# Patient Record
Sex: MALE | Race: WHITE | Employment: OTHER | URBAN - METROPOLITAN AREA
[De-identification: names, ages, dates, MRNs, and addresses within clinical notes are randomized per-mention and may not be internally consistent; named-entity substitution may affect disease eponyms.]

---

## 2020-03-03 ENCOUNTER — ED HISTORICAL/CONVERTED ENCOUNTER (OUTPATIENT)
Dept: OTHER | Age: 45
End: 2020-03-03

## 2020-03-11 ENCOUNTER — OP HISTORICAL/CONVERTED ENCOUNTER (OUTPATIENT)
Dept: OTHER | Age: 45
End: 2020-03-11

## 2020-09-17 ENCOUNTER — APPOINTMENT (OUTPATIENT)
Dept: GENERAL RADIOLOGY | Age: 45
End: 2020-09-17
Attending: EMERGENCY MEDICINE
Payer: COMMERCIAL

## 2020-09-17 ENCOUNTER — HOSPITAL ENCOUNTER (EMERGENCY)
Age: 45
Discharge: HOME | End: 2020-09-17
Attending: EMERGENCY MEDICINE
Payer: COMMERCIAL

## 2020-09-17 VITALS
TEMPERATURE: 97.7 F | HEART RATE: 86 BPM | SYSTOLIC BLOOD PRESSURE: 128 MMHG | WEIGHT: 260 LBS | DIASTOLIC BLOOD PRESSURE: 88 MMHG | HEIGHT: 71 IN | OXYGEN SATURATION: 95 % | BODY MASS INDEX: 36.4 KG/M2 | RESPIRATION RATE: 18 BRPM

## 2020-09-17 DIAGNOSIS — K59.00 CONSTIPATION, UNSPECIFIED CONSTIPATION TYPE: Primary | ICD-10-CM

## 2020-09-17 LAB
ALBUMIN SERPL-MCNC: 3.7 G/DL (ref 3.5–5)
ALBUMIN/GLOB SERPL: 0.9 {RATIO} (ref 1.1–2.2)
ALP SERPL-CCNC: 72 U/L (ref 45–117)
ALT SERPL-CCNC: 22 U/L (ref 12–78)
ANION GAP SERPL CALC-SCNC: 9 MMOL/L (ref 5–15)
APPEARANCE UR: CLEAR
AST SERPL W P-5'-P-CCNC: 16 U/L (ref 15–37)
BACTERIA URNS QL MICRO: NEGATIVE /HPF
BASOPHILS # BLD: 0 K/UL (ref 0–0.1)
BASOPHILS NFR BLD: 0 % (ref 0–1)
BILIRUB SERPL-MCNC: 1.1 MG/DL (ref 0.2–1)
BILIRUB UR QL: NEGATIVE
BUN SERPL-MCNC: 25 MG/DL (ref 6–20)
BUN/CREAT SERPL: 21 (ref 12–20)
CA-I BLD-MCNC: 9.4 MG/DL (ref 8.5–10.1)
CHLORIDE SERPL-SCNC: 101 MMOL/L (ref 97–108)
CO2 SERPL-SCNC: 26 MMOL/L (ref 21–32)
COLOR UR: ABNORMAL
CREAT SERPL-MCNC: 1.21 MG/DL (ref 0.7–1.3)
DIFFERENTIAL METHOD BLD: NORMAL
EOSINOPHIL # BLD: 0.2 K/UL (ref 0–0.4)
EOSINOPHIL NFR BLD: 2 % (ref 0–7)
EPITH CASTS URNS QL MICRO: ABNORMAL /LPF
ERYTHROCYTE [DISTWIDTH] IN BLOOD BY AUTOMATED COUNT: 13.9 % (ref 11.5–14.5)
GLOBULIN SER CALC-MCNC: 4.1 G/DL (ref 2–4)
GLUCOSE SERPL-MCNC: 102 MG/DL (ref 65–100)
GLUCOSE UR STRIP.AUTO-MCNC: 100 MG/DL
HCT VFR BLD AUTO: 48.2 % (ref 36.6–50.3)
HGB BLD-MCNC: 16 % (ref 12.1–17)
HGB UR QL STRIP: NEGATIVE
IMM GRANULOCYTES # BLD AUTO: 0 K/UL (ref 0–0.04)
IMM GRANULOCYTES NFR BLD AUTO: 0 % (ref 0–0.5)
KETONES UR QL STRIP.AUTO: NEGATIVE MG/DL
LEUKOCYTE ESTERASE UR QL STRIP.AUTO: NEGATIVE
LIPASE SERPL-CCNC: 77 U/L (ref 73–393)
LYMPHOCYTES # BLD: 2.6 K/UL (ref 0.8–3.5)
LYMPHOCYTES NFR BLD: 26 % (ref 12–49)
MCH RBC QN AUTO: 28.2 PG (ref 26–34)
MCHC RBC AUTO-ENTMCNC: 33.2 G/DL (ref 30–36.5)
MCV RBC AUTO: 84.9 FL (ref 80–99)
MONOCYTES # BLD: 0.7 K/UL (ref 0–1)
MONOCYTES NFR BLD: 7 % (ref 5–13)
NEUTS SEG # BLD: 6.5 K/UL (ref 1.8–8)
NEUTS SEG NFR BLD: 65 % (ref 32–75)
NITRITE UR QL STRIP.AUTO: NEGATIVE
PH UR STRIP: 5 [PH] (ref 5–8)
PLATELET # BLD AUTO: 285 K/UL (ref 150–400)
PMV BLD AUTO: 8.9 FL (ref 8.9–12.9)
POTASSIUM SERPL-SCNC: 4.2 MMOL/L (ref 3.5–5.1)
PROT SERPL-MCNC: 7.8 G/DL (ref 6.4–8.2)
PROT UR STRIP-MCNC: NEGATIVE MG/DL
RBC # BLD AUTO: 5.68 M/UL (ref 4.1–5.7)
RBC #/AREA URNS HPF: ABNORMAL /HPF (ref 0–5)
SODIUM SERPL-SCNC: 136 MMOL/L (ref 136–145)
SP GR UR REFRACTOMETRY: 1.02 (ref 1–1.03)
UA: UC IF INDICATED,UAUC: ABNORMAL
UROBILINOGEN UR QL STRIP.AUTO: 0.1 EU/DL (ref 0.2–1)
WBC # BLD AUTO: 10 K/UL (ref 4.1–11.1)
WBC URNS QL MICRO: ABNORMAL /HPF (ref 0–4)

## 2020-09-17 PROCEDURE — 81001 URINALYSIS AUTO W/SCOPE: CPT

## 2020-09-17 PROCEDURE — 85025 COMPLETE CBC W/AUTO DIFF WBC: CPT

## 2020-09-17 PROCEDURE — 74011250637 HC RX REV CODE- 250/637

## 2020-09-17 PROCEDURE — 80053 COMPREHEN METABOLIC PANEL: CPT

## 2020-09-17 PROCEDURE — 99283 EMERGENCY DEPT VISIT LOW MDM: CPT

## 2020-09-17 PROCEDURE — 83690 ASSAY OF LIPASE: CPT

## 2020-09-17 PROCEDURE — 74018 RADEX ABDOMEN 1 VIEW: CPT

## 2020-09-17 PROCEDURE — 36415 COLL VENOUS BLD VENIPUNCTURE: CPT

## 2020-09-17 RX ORDER — MAGNESIUM CITRATE
SOLUTION, ORAL ORAL
Status: COMPLETED
Start: 2020-09-17 | End: 2020-09-17

## 2020-09-17 RX ORDER — MAGNESIUM CITRATE
296 SOLUTION, ORAL ORAL
Status: COMPLETED | OUTPATIENT
Start: 2020-09-17 | End: 2020-09-17

## 2020-09-17 RX ORDER — MAGNESIUM CITRATE
296 SOLUTION, ORAL ORAL
Qty: 1 BOTTLE | Refills: 0 | Status: SHIPPED | OUTPATIENT
Start: 2020-09-17 | End: 2020-09-17

## 2020-09-17 RX ADMIN — Medication 296 ML: at 21:25

## 2020-09-17 RX ADMIN — MAGNESIUM CITRATE 296 ML: 1.75 LIQUID ORAL at 21:25

## 2020-09-17 NOTE — Clinical Note
[address] [address] [place] [id number] 
[phone] Work/School Note Date: 9/17/2020 To Whom It May concern: 
 
 
Jacqueline Paz was seen and treated today in the emergency room by the following provider(s): 
Attending Provider: Durga Severino MD.   
 
Jacqueline Paz is excused from work/school on 09/17/20. He is clear to return to work/school on 09/18/20. Sincerely, Nelly Hung MD

## 2020-09-18 NOTE — ED PROVIDER NOTES
EMERGENCY DEPARTMENT HISTORY AND PHYSICAL EXAM      Date: 9/17/2020  Patient Name: Rosmery Lei    History of Presenting Illness     Chief Complaint   Patient presents with    Abdominal Pain    Rectal Bleeding       History Provided By: Patient    HPI: Rosmery Lei, 39 y.o. male with a past medical history significant No significant past medical history presents to the ED with cc of constipation, abdominal cramping and some blood in the stool. Symptoms have been over the last 1 day. She states passage of very hard stools, with some blood in the stool. No relieving or aggravating factors. No vomiting, fever or other constitutional symptoms. There are no other complaints, changes, or physical findings at this time. PCP: No primary care provider on file. No current facility-administered medications on file prior to encounter. No current outpatient medications on file prior to encounter. Past History     Past Medical History:  History reviewed. No pertinent past medical history. Past Surgical History:  Past Surgical History:   Procedure Laterality Date    HX APPENDECTOMY         Family History:  History reviewed. No pertinent family history. Social History:  Social History     Tobacco Use    Smoking status: Never Smoker    Smokeless tobacco: Never Used   Substance Use Topics    Alcohol use: Not Currently    Drug use: Yes     Types: Methamphetamines       Allergies:  No Known Allergies      Review of Systems     Review of Systems   Constitutional: Negative. HENT: Negative. Eyes: Negative. Respiratory: Negative. Cardiovascular: Negative for palpitations and leg swelling. Gastrointestinal: Positive for constipation. Endocrine: Negative. Genitourinary: Negative. Musculoskeletal: Negative. Allergic/Immunologic: Negative. Neurological: Negative. Hematological: Negative. Psychiatric/Behavioral: Negative.     All other systems reviewed and are negative. Physical Exam     Physical Exam  Vitals signs and nursing note reviewed. Constitutional:       General: He is not in acute distress. Appearance: He is well-developed. He is not toxic-appearing or diaphoretic. HENT:      Head: Normocephalic and atraumatic. Eyes:      Extraocular Movements: Extraocular movements intact. Pupils: Pupils are equal, round, and reactive to light. Neck:      Musculoskeletal: Normal range of motion and neck supple. Cardiovascular:      Rate and Rhythm: Normal rate and regular rhythm. Heart sounds: Normal heart sounds. Pulmonary:      Effort: Pulmonary effort is normal. No respiratory distress. Breath sounds: Normal breath sounds. Chest:      Chest wall: No mass or tenderness. Abdominal:      General: Bowel sounds are normal. There is no abdominal bruit. Palpations: Abdomen is soft. There is no hepatomegaly. Tenderness: There is no abdominal tenderness. There is no rebound. Musculoskeletal: Normal range of motion. Right lower leg: He exhibits no tenderness. No edema. Left lower leg: He exhibits no tenderness. No edema. Skin:     General: Skin is warm and dry. Capillary Refill: Capillary refill takes less than 2 seconds. Neurological:      General: No focal deficit present. Mental Status: He is alert and oriented to person, place, and time.    Psychiatric:         Mood and Affect: Mood normal.         Behavior: Behavior normal.         Diagnostic Study Results     Labs -     Recent Results (from the past 12 hour(s))   CBC WITH AUTOMATED DIFF    Collection Time: 09/17/20  8:30 PM   Result Value Ref Range    WBC 10.0 4.1 - 11.1 K/uL    RBC 5.68 4.10 - 5.70 M/uL    HGB 16.0 12.1 - 17.0 %    HCT 48.2 36.6 - 50.3 %    MCV 84.9 80.0 - 99.0 FL    MCH 28.2 26.0 - 34.0 PG    MCHC 33.2 30.0 - 36.5 g/dL    RDW 13.9 11.5 - 14.5 %    PLATELET 581 840 - 395 K/uL    MPV 8.9 8.9 - 12.9 FL    NEUTROPHILS 65 32 - 75 % LYMPHOCYTES 26 12 - 49 %    MONOCYTES 7 5 - 13 %    EOSINOPHILS 2 0 - 7 %    BASOPHILS 0 0 - 1 %    IMMATURE GRANULOCYTES 0 0.0 - 0.5 %    ABS. NEUTROPHILS 6.5 1.8 - 8.0 K/UL    ABS. LYMPHOCYTES 2.6 0.8 - 3.5 K/UL    ABS. MONOCYTES 0.7 0.0 - 1.0 K/UL    ABS. EOSINOPHILS 0.2 0.0 - 0.4 K/UL    ABS. BASOPHILS 0.0 0.0 - 0.1 K/UL    ABS. IMM. GRANS. 0.0 0.00 - 0.04 K/UL    DF AUTOMATED     URINALYSIS W/ REFLEX CULTURE    Collection Time: 09/17/20  8:30 PM    Specimen: Urine   Result Value Ref Range    Color Yellow/Straw      Appearance Clear Clear      Specific gravity 1.025 1.003 - 1.030      pH (UA) 5.0 5.0 - 8.0      Protein Negative Negative mg/dL    Glucose 100 (A) Negative mg/dL    Ketone Negative Negative mg/dL    Bilirubin Negative Negative      Blood Negative Negative      Urobilinogen 0.1 (L) 0.2 - 1.0 EU/dL    Nitrites Negative Negative      Leukocyte Esterase Negative Negative      WBC 0-4 0 - 4 /hpf    RBC 0-5 0 - 5 /hpf    Epithelial cells Few Few /lpf    Bacteria Negative Negative /hpf    UA:UC IF INDICATED Culture not indicated by UA result Culture not indicated by UA result         Radiologic Studies -   @lastxrresult@  CT Results  (Last 48 hours)    None        CXR Results  (Last 48 hours)    None        KUB Xray  Shows moderate constipation. No other acute pathology    Medical Decision Making   I am the first provider for this patient. I reviewed the vital signs, available nursing notes, past medical history, past surgical history, family history and social history. Vital Signs-Reviewed the patient's vital signs. Patient Vitals for the past 12 hrs:   Temp Pulse Resp BP SpO2   09/17/20 1942 97.7 °F (36.5 °C) 86 18 128/88 95 %       Records Reviewed: Nursing Notes    Provider Notes (Medical Decision Making): Uneventful ED course, clinical improvement with therapy, patient will be discharged to followup with PCP as directed    ED Course:   Initial assessment performed.  The patients presenting problems have been discussed, and they are in agreement with the care plan formulated and outlined with them. I have encouraged them to ask questions as they arise throughout their visit. PLAN:  1. Current Discharge Medication List      START taking these medications    Details   magnesium citrate solution Take 296 mL by mouth now for 1 dose. Qty: 1 Bottle, Refills: 0           2. Follow-up Information     Follow up With Specialties Details Why Contact Info    Follow-up with the PCP of your choice  In 2 days      Keep your appointment with your scheduled gastroenterologist tomorrow            Return to ED if worse     Diagnosis     Clinical Impression:   1. Constipation, unspecified constipation type          Patients ED visit today does not show an emergent medical condition that requires inpatient admission; The diagnosis, treatment plan and the need for follow up have been discussed in detail with the patient and he has been given the opportunity to ask questions. All such questions at this time, have been satisfactorily answered; patient remains in stable condition for discharge.

## 2021-04-07 ENCOUNTER — APPOINTMENT (OUTPATIENT)
Dept: GENERAL RADIOLOGY | Age: 46
End: 2021-04-07
Attending: FAMILY MEDICINE
Payer: COMMERCIAL

## 2021-04-07 ENCOUNTER — HOSPITAL ENCOUNTER (EMERGENCY)
Age: 46
Discharge: HOME | End: 2021-04-07
Attending: FAMILY MEDICINE
Payer: COMMERCIAL

## 2021-04-07 VITALS
DIASTOLIC BLOOD PRESSURE: 98 MMHG | RESPIRATION RATE: 16 BRPM | TEMPERATURE: 97.8 F | BODY MASS INDEX: 36.4 KG/M2 | HEART RATE: 88 BPM | WEIGHT: 260 LBS | OXYGEN SATURATION: 98 % | SYSTOLIC BLOOD PRESSURE: 149 MMHG | HEIGHT: 71 IN

## 2021-04-07 DIAGNOSIS — L03.114 CELLULITIS OF LEFT UPPER EXTREMITY: Primary | ICD-10-CM

## 2021-04-07 PROCEDURE — 73080 X-RAY EXAM OF ELBOW: CPT

## 2021-04-07 PROCEDURE — 99282 EMERGENCY DEPT VISIT SF MDM: CPT

## 2021-04-07 RX ORDER — FAMOTIDINE 20 MG/1
20 TABLET, FILM COATED ORAL ONCE
Status: DISCONTINUED | OUTPATIENT
Start: 2021-04-07 | End: 2021-04-07

## 2021-04-07 RX ORDER — CEPHALEXIN 500 MG/1
500 CAPSULE ORAL 4 TIMES DAILY
Qty: 28 CAP | Refills: 0 | Status: SHIPPED | OUTPATIENT
Start: 2021-04-07 | End: 2021-04-14

## 2021-04-07 NOTE — DISCHARGE INSTRUCTIONS
Thank you! Thank you for allowing me to care for you in the emergency department. I sincerely hope that you are satisfied with your visit today. It is my goal to provide you with excellent care. Below you will find a list of your labs and imaging from your visit today. Should you have any questions regarding these results please do not hesitate to call the emergency department. Labs -   No results found for this or any previous visit (from the past 12 hour(s)). Radiologic Studies -   XR ELBOW LT MIN 3 V    (Results Pending)     CT Results  (Last 48 hours)      None          CXR Results  (Last 48 hours)      None               If you feel that you have not received excellent quality care or timely care, please ask to speak to the nurse manager. Please choose us in the future for your continued health care needs. ------------------------------------------------------------------------------------------------------------  The exam and treatment you received in the Emergency Department were for an urgent problem and are not intended as complete care. It is important that you follow-up with a doctor, nurse practitioner, or physician assistant to:  (1) confirm your diagnosis,  (2) re-evaluation of changes in your illness and treatment, and  (3) for ongoing care. If your symptoms become worse or you do not improve as expected and you are unable to reach your usual health care provider, you should return to the Emergency Department. We are available 24 hours a day. Please take your discharge instructions with you when you go to your follow-up appointment. If you have any problem arranging a follow-up appointment, contact the Emergency Department immediately. If a prescription has been provided, please have it filled as soon as possible to prevent a delay in treatment. Read the entire medication instruction sheet provided to you by the pharmacy.  If you have any questions or reservations about taking the medication due to side effects or interactions with other medications, please call your primary care physician or contact the ER to speak with the charge nurse. Make an appointment with your family doctor or the physician you were referred to for follow-up of this visit as instructed on your discharge paperwork, as this is a mandatory follow-up. Return to the ER if you are unable to be seen or if you are unable to be seen in a timely manner. If you have any problem arranging the follow-up visit, contact the Emergency Department immediately.

## 2021-04-07 NOTE — ED PROVIDER NOTES
EMERGENCY DEPARTMENT HISTORY AND PHYSICAL EXAM      Date: 4/7/2021  Patient Name: Nikia Dorsey    History of Presenting Illness     Chief Complaint   Patient presents with    Skin Problem       History Provided By:     HPI: Nikia Dorsey, is an extremely pleasant 39 y.o. male presenting to the ED with a chief complaint of skin problem. He states several days ago he noticed redness and swelling of his left forearm. He states is getting worse. It is warm to the touch. He thinks at one point it may have drained some material but he is not exactly sure. He endorses a history of IV drug use but quit last week. He denies any streaking up his arm. He denies any lesions elsewhere. He denies fevers, chills or rigors. No nausea, vomiting or diarrhea. He had similar symptoms approximately a year ago. There are no other complaints, changes, or physical findings at this time. PCP: None    No current facility-administered medications on file prior to encounter. No current outpatient medications on file prior to encounter. Past History     Past Medical History:  No past medical history on file. Past Surgical History:  Past Surgical History:   Procedure Laterality Date    HX APPENDECTOMY         Family History:  No family history on file. Social History:  Social History     Tobacco Use    Smoking status: Never Smoker    Smokeless tobacco: Never Used   Substance Use Topics    Alcohol use: Not Currently    Drug use: Not Currently     Types: Methamphetamines       Allergies:  No Known Allergies      Review of Systems     Review of Systems   Skin: Positive for color change and rash. Physical Exam     Physical Exam  Constitutional:       General: He is not in acute distress. Appearance: Normal appearance. He is not toxic-appearing. HENT:      Head: Normocephalic and atraumatic.       Right Ear: External ear normal.      Left Ear: External ear normal.      Mouth/Throat:      Mouth: Mucous membranes are moist.      Pharynx: Oropharynx is clear. Eyes:      Extraocular Movements: Extraocular movements intact. Conjunctiva/sclera: Conjunctivae normal.   Neck:      Musculoskeletal: Normal range of motion and neck supple. Cardiovascular:      Rate and Rhythm: Normal rate and regular rhythm. Pulses: Normal pulses. Heart sounds: Normal heart sounds. Pulmonary:      Effort: Pulmonary effort is normal. No respiratory distress. Breath sounds: Normal breath sounds. No wheezing, rhonchi or rales. Abdominal:      General: There is no distension. Palpations: Abdomen is soft. Tenderness: There is no abdominal tenderness. There is no guarding or rebound. Musculoskeletal:         General: No deformity. Right lower leg: No edema. Left lower leg: No edema. Comments: Muscle strength testing at the left elbow, wrist and fingers is 5 out of 5. No focal neurologic deficits. Skin:     Capillary Refill: Capillary refill takes less than 2 seconds. Findings: No erythema or rash. Comments: Induration and cellulitis of the left antecubital fossa. There is no purulent drainage nor fluctuance. No lymphangitis. Ultrasound at the bedside was performed, no pocket of fluid identified. Neurological:      General: No focal deficit present. Mental Status: He is alert and oriented to person, place, and time. Gait: Gait normal.   Psychiatric:         Mood and Affect: Mood normal.         Behavior: Behavior normal.         Lab and Diagnostic Study Results     Labs -   No results found for this or any previous visit (from the past 12 hour(s)). Radiologic Studies -   @lastxrresult@  CT Results  (Last 48 hours)    None        CXR Results  (Last 48 hours)    None            Medical Decision Making   - I am the first provider for this patient.   - I reviewed the vital signs, available nursing notes, past medical history, past surgical history, family history and social history. - Initial assessment performed. The patients presenting problems have been discussed, and they are in agreement with the care plan formulated and outlined with them. I have encouraged them to ask questions as they arise throughout their visit. Vital Signs-Reviewed the patient's vital signs. Patient Vitals for the past 12 hrs:   Temp Pulse Resp BP SpO2   04/07/21 1208 97.8 °F (36.6 °C) 88 16 (!) 149/98 98 %         ED Course/ Provider Notes (Medical Decision Making):     Patient presented to the emergency department with a chief complaint of redness and swelling of the left antecubital fossa. Patient has indurated cellulitis of the antecubital fossa. No fluctuance nor purulent drainage. Bedside ultrasound demonstrates no abscess. X-ray of the left elbow: Soft tissue swelling and edema. No radiopaque foreign body or acute  osseous abnormality definitively identified. He was subsequently placed on Keflex with strict return precautions            Ronna Flores was given a thorough list of signs and symptoms that would warrant an immediate return to the emergency department. Otherwise Ronna Flores will follow up with PCP. Procedures   Medical Decision Makingedical Decision Making  Performed by: Magaly Van DO  Procedures  None       Disposition   Disposition:     Home     All of the diagnostic tests were reviewed and questions answered. Diagnosis, care plan and treatment options were discussed. The patient understands the instructions and will follow up as directed. The patients results have been reviewed with them. They have been counseled regarding their diagnosis. The patient verbally convey understanding and agreement of the signs, symptoms, diagnosis, treatment and prognosis and additionally agrees to follow up as recommended with their PCP in 24 - 48 hours. They also agree with the care-plan and convey that all of their questions have been answered.   I have also put together some discharge instructions for them that include: 1) educational information regarding their diagnosis, 2) how to care for their diagnosis at home, as well a 3) list of reasons why they would want to return to the ED prior to their follow-up appointment, should their condition change. DISCHARGE PLAN:    1. Cannot display discharge medications since this patient is not currently admitted. 2.   Follow-up Information    None           3. Return to ED if worse       4. Discharge Medication List as of 4/7/2021  1:30 PM      START taking these medications    Details   cephALEXin (Keflex) 500 mg capsule Take 1 Cap by mouth four (4) times daily for 7 days. , Normal, Disp-28 Cap, R-0               Diagnosis     Clinical Impression:    1. Cellulitis of left upper extremity        Attestations:    Sydney Harris, DO    Please note that this dictation was completed with [employer], the computer voice recognition software. Quite often unanticipated grammatical, syntax, homophones, and other interpretive errors are inadvertently transcribed by the computer software. Please disregard these errors. Please excuse any errors that have escaped final proofreading. Thank you.

## 2021-04-07 NOTE — ED TRIAGE NOTES
Pt presents with abscess to the inside of the left elbow for 3 days. Thinks he may have a piece of metal stuck in it. Draining clear to cloudy fluid. Denies fever, chills, nausea, or vomiting.

## 2021-09-22 ENCOUNTER — OFFICE VISIT (OUTPATIENT)
Dept: INTERNAL MEDICINE CLINIC | Age: 46
End: 2021-09-22
Payer: COMMERCIAL

## 2021-09-22 VITALS
OXYGEN SATURATION: 96 % | TEMPERATURE: 99.2 F | SYSTOLIC BLOOD PRESSURE: 130 MMHG | BODY MASS INDEX: 36.9 KG/M2 | HEART RATE: 100 BPM | DIASTOLIC BLOOD PRESSURE: 81 MMHG | RESPIRATION RATE: 18 BRPM | HEIGHT: 71 IN | WEIGHT: 263.6 LBS

## 2021-09-22 DIAGNOSIS — E66.9 OBESITY (BMI 35.0-39.9 WITHOUT COMORBIDITY): ICD-10-CM

## 2021-09-22 DIAGNOSIS — R06.02 SOB (SHORTNESS OF BREATH): ICD-10-CM

## 2021-09-22 DIAGNOSIS — R00.2 INTERMITTENT PALPITATIONS: ICD-10-CM

## 2021-09-22 DIAGNOSIS — U07.1 COVID-19: ICD-10-CM

## 2021-09-22 DIAGNOSIS — F15.10 METHAMPHETAMINE ABUSE, EPISODIC (HCC): ICD-10-CM

## 2021-09-22 DIAGNOSIS — J06.9 VIRAL URI WITH COUGH: Primary | ICD-10-CM

## 2021-09-22 DIAGNOSIS — Z11.59 NEED FOR HEPATITIS C SCREENING TEST: ICD-10-CM

## 2021-09-22 DIAGNOSIS — Z12.11 COLON CANCER SCREENING: ICD-10-CM

## 2021-09-22 DIAGNOSIS — Z00.00 ROUTINE ADULT HEALTH MAINTENANCE: ICD-10-CM

## 2021-09-22 LAB
ELECTROCARDIOGRAM,EKG: NORMAL
QUICKVUE INFLUENZA TEST: NEGATIVE
VALID INTERNAL CONTROL?: YES

## 2021-09-22 PROCEDURE — 87804 INFLUENZA ASSAY W/OPTIC: CPT | Performed by: INTERNAL MEDICINE

## 2021-09-22 PROCEDURE — G2023 SPECIMEN COLLECT COVID-19: HCPCS | Performed by: INTERNAL MEDICINE

## 2021-09-22 PROCEDURE — 93000 ELECTROCARDIOGRAM COMPLETE: CPT | Performed by: INTERNAL MEDICINE

## 2021-09-22 PROCEDURE — 99204 OFFICE O/P NEW MOD 45 MIN: CPT | Performed by: INTERNAL MEDICINE

## 2021-09-22 NOTE — PATIENT INSTRUCTIONS
Learning About Coronavirus [phone])  Coronavirus [phone]): Overview  What is coronavirus (COVID-19)? The coronavirus disease (COVID-19) is caused by a virus. It is an illness that was first found in [place], [place], in December 2019. It has since spread worldwide. The virus can cause fever, cough, and trouble breathing. In severe cases, it can cause pneumonia and make it hard to breathe without help. It can cause death. Coronaviruses are a large group of viruses. They cause the common cold. They also cause more serious illnesses like [place] East respiratory syndrome (MERS) and severe acute respiratory syndrome (SARS). COVID-19 is caused by a novel coronavirus. That means it's a new type that has not been seen in people before. This virus spreads person-to-person through droplets from coughing and sneezing. It can also spread when you are close to someone who is infected. And it can spread when you touch something that has the virus on it, such as a doorknob or a tabletop. What can you do to protect yourself from coronavirus (COVID-19)? The best way to protect yourself from getting sick is to:  · Avoid areas where there is an outbreak. · Avoid contact with people who may be infected. · Wash your hands often with soap or alcohol-based hand sanitizers. · Avoid crowds and try to stay at least 6 feet away from other people. · Wash your hands often, especially after you cough or sneeze. Use soap and water, and scrub for at least 20 seconds. If soap and water aren't available, use an alcohol-based hand . · Avoid touching your mouth, nose, and eyes. What can you do to avoid spreading the virus to others? To help avoid spreading the virus to others:  · Cover your mouth with a tissue when you cough or sneeze. Then throw the tissue in the trash. · Use a disinfectant to clean things that you touch often. · Stay home if you are sick or have been exposed to the virus.  Don't go to school, work, or public areas. And don't use public transportation. · If you are sick:  ? Leave your home only if you need to get medical care. But call the doctor's office first so they know you're coming. And wear a face mask, if you have one.  ? If you have a face mask, wear it whenever you're around other people. It can help stop the spread of the virus when you cough or sneeze. ? Clean and disinfect your home every day. Use household  and disinfectant wipes or sprays. Take special care to clean things that you grab with your hands. These include doorknobs, remote controls, phones, and handles on your refrigerator and microwave. And don't forget countertops, tabletops, bathrooms, and computer keyboards. When to call for help  Call 911 anytime you think you may need emergency care. For example, call if:  · You have severe trouble breathing. (You can't talk at all.)  · You have constant chest pain or pressure. · You are severely dizzy or lightheaded. · You are confused or can't think clearly. · Your face and lips have a blue color. · You pass out (lose consciousness) or are very hard to wake up. Call your doctor now if you develop symptoms such as:  · Shortness of breath. · Fever. · Cough. If you need to get care, call ahead to the doctor's office for instructions before you go. Make sure you wear a face mask, if you have one, to prevent exposing other people to the virus. Where can you get the latest information? The following health organizations are tracking and studying this virus. Their websites contain the most up-to-date information. [place] also learn what to do if you think you may have been exposed to the virus. · U.S. Centers for Disease Control and Prevention (CDC): The CDC provides updated news about the disease and travel advice. The website also tells you how to prevent the spread of infection. www.cdc.gov  · World Health Organization [facility]): WHO offers information about the virus outbreaks.  WHO also has travel advice. www.who.int  Current as of: April 1, 2020               Content Version: 12.4  © 8485-0467 [employer]. Care instructions adapted under license by your healthcare professional. If you have questions about a medical condition or this instruction, always ask your healthcare professional. [address] any warranty or liability for your use of this information.

## 2021-09-22 NOTE — PROGRESS NOTES
Tl Amador is a 55 y.o. male and presents with New Patient, Breathing Problem, Dizziness, Addiction problem, Cough, Concern For COVID-19 (Coronavirus), and Generalized Body Aches    Patient is establishing care. His son and his mom tested positive for covid this past weekend (3 days ago). He has cough, headache, malaise, generalized body aches since this morning. He's not vaccinated. He abuses crystal meth, last time he used was last week, he has been doing it for 10 years, apparently he has a prior history of pericarditis from what he says, but I do not have records. He says since he feels sob when going up a ladder or upstairs or something exertional after a few minutes which didn't use to be like that. He feels palpitations when this happens and feels lightheaded, many times when he stands up he gets dizzy. No problems lifting weights, but gets sob walking in a threadmill. He says he feels tired all the time,     He has lost 20 lbs in the past 2 months working a lot he says. , he says he eats afast food and sweets. No depression or anxiety     Review of Systems  Review of Systems   Constitutional: Negative for chills, fatigue, fever and unexpected weight change. HENT: Negative for congestion, ear pain, sneezing and sore throat. Eyes: Negative for pain and discharge. Respiratory: Negative for cough, shortness of breath and wheezing. Cardiovascular: Negative for chest pain, palpitations and leg swelling. Gastrointestinal: Negative for abdominal pain, blood in stool, constipation and diarrhea. Endocrine: Negative for polydipsia and polyuria. Genitourinary: Negative for difficulty urinating, dysuria, frequency, hematuria and urgency. Musculoskeletal: Negative for arthralgias, back pain and joint swelling. Skin: Negative for rash. Allergic/Immunologic: Negative for environmental allergies and food allergies.    Neurological: Negative for dizziness, speech difficulty, weakness, light-headedness, numbness and headaches. Hematological: Negative for adenopathy. Psychiatric/Behavioral: Negative for behavioral problems (Depression), sleep disturbance and suicidal ideas. Past Medical History:   Diagnosis Date    Arrhythmia     Headache      Past Surgical History:   Procedure Laterality Date    HX APPENDECTOMY       Social History     Socioeconomic History    Marital status:      Spouse name: Not on file    Number of children: Not on file    Years of education: Not on file    Highest education level: Not on file   Tobacco Use    Smoking status: Never Smoker    Smokeless tobacco: Never Used   Vaping Use    Vaping Use: Never used   Substance and Sexual Activity    Alcohol use: Not Currently    Drug use: Not Currently     Types: Methamphetamines     Comment: used meth for 10 years states that he is still using this drug      Social Determinants of Health     Financial Resource Strain:     Difficulty of Paying Living Expenses:    Food Insecurity:     Worried About Running Out of Food in the Last Year:     Ran Out of Food in the Last Year:    Transportation Needs:     Lack of Transportation (Medical):      Lack of Transportation (Non-Medical):    Physical Activity:     Days of Exercise per Week:     Minutes of Exercise per Session:    Stress:     Feeling of Stress :    Social Connections:     Frequency of Communication with Friends and Family:     Frequency of Social Gatherings with Friends and Family:     Attends Holiness Services:     Active Member of Clubs or Organizations:     Attends Club or Organization Meetings:     Marital Status:      Family History   Problem Relation Age of Onset    Cancer Mother     COPD Father     Heart Disease Father     Cancer Maternal Grandmother     Cancer Maternal Grandfather        No Known Allergies    Objective:  Visit Vitals  /81 (BP 1 Location: Left upper arm, BP Patient Position: Sitting, BP Cuff Size: Adult)   Pulse 100   Temp 99.2 °F (37.3 °C) (Oral)   Resp 18   Ht 5' 11\" (1.803 m)   Wt 263 lb 9.6 oz (119.6 kg)   SpO2 96% Comment: RA   BMI 36.76 kg/m²     Physical Exam:   Physical Exam  Constitutional:       General: He is not in acute distress. Appearance: Normal appearance. HENT:      Head: Normocephalic and atraumatic. Mouth/Throat:      Mouth: Mucous membranes are moist.   Eyes:      Extraocular Movements: Extraocular movements intact. Conjunctiva/sclera: Conjunctivae normal.      Pupils: Pupils are equal, round, and reactive to light. Cardiovascular:      Rate and Rhythm: Normal rate and regular rhythm. Pulses: Normal pulses. Heart sounds: Normal heart sounds. Pulmonary:      Effort: Pulmonary effort is normal.      Breath sounds: Normal breath sounds. Abdominal:      General: Abdomen is flat. Bowel sounds are normal. There is no distension. Palpations: Abdomen is soft. There is no mass. Tenderness: There is no abdominal tenderness. Musculoskeletal:         General: No swelling or deformity. Cervical back: Normal range of motion and neck supple. Right lower leg: No edema. Left lower leg: No edema. Lymphadenopathy:      Cervical: No cervical adenopathy. Skin:     General: Skin is warm and dry. Capillary Refill: Capillary refill takes less than 2 seconds. Coloration: Skin is not jaundiced or pale. Findings: No erythema or rash. Neurological:      General: No focal deficit present. Mental Status: He is alert and oriented to person, place, and time. Psychiatric:         Mood and Affect: Mood normal.         Behavior: Behavior normal.         Thought Content:  Thought content normal.         Judgment: Judgment normal.          Results for orders placed or performed in visit on 09/22/21   NOVEL CORONAVIRUS (COVID-19)   Result Value Ref Range    SARS-CoV-2, COLTON Detected (A) Not Detected   SARS-COV-2, COLTON 2 DAY TAT   Result Value Ref Range    SARS-CoV-2, COLTON 2 DAY TAT Performed    AMB POC RAPID INFLUENZA TEST   Result Value Ref Range    VALID INTERNAL CONTROL POC Yes     QuickVue Influenza test Negative Negative   AMB POC EKG ROUTINE W/ 12 LEADS, INTER & REP   Result Value Ref Range    EKG         Assessment/Plan:    High suspicion of covid, tested, gave him verbal and written recommendations and signs of alarm, when we receive result will get back to him. In the mean time he need to quarantine. Discussed with him about Regeneron option if he tests positive since he's obese. Will revisit that when result comes back. Episodical abuser of crystal meth, episodes of sob (not acute), palpitations, lightheadedness, prior h/o pericarditis, EKG wnl, will do echo. Counseled on lifestyle modification including stopping meth, cousneled on cardiovascular risk. 1200 calorie diet given, recommended brisk walking 30 to 40 mins. Goal to lose 5% of her weight in 3 months. ICD-10-CM ICD-9-CM    1. Viral URI with cough  J06.9 465.9 PSEUDOEPHEDRINE-dextromethorphan-guaiFENesin (ROBITUSSIN-CE) 30- mg/5 mL solution      AMB POC RAPID INFLUENZA TEST   2. Routine adult health maintenance  Z00.00 V70.0 CBC WITH AUTOMATED DIFF      METABOLIC PANEL, COMPREHENSIVE      TSH 3RD GENERATION   3. Obesity (BMI 35.0-39.9 without comorbidity)  E66.9 278.00 LIPID PANEL   4. Need for hepatitis C screening test  Z11.59 V73.89 HEPATITIS C AB   5. Colon cancer screening  Z12.11 V76.51 REFERRAL TO GASTROENTEROLOGY   6. SOB (shortness of breath)  R06.02 786.05 ECHO ADULT COMPLETE      AMB POC EKG ROUTINE W/ 12 LEADS, INTER & REP   7. Intermittent palpitations  R00.2 785.1 ECHO ADULT COMPLETE   8.  Methamphetamine abuse, episodic (HCC)  F15.10 305.72 ECHO ADULT COMPLETE   9. COVID-19  U07.1 079.89 NOVEL CORONAVIRUS (COVID-19)     Orders Placed This Encounter    CBC WITH AUTOMATED DIFF    METABOLIC PANEL, COMPREHENSIVE    TSH 3RD GENERATION    LIPID PANEL    HEPATITIS C AB    NOVEL CORONAVIRUS (COVID-19) ([facility] Default)     Scheduling Instructions:      1) Due to current limited availability of the COVID-19 PCR test, tests will be prioritized and may not be completed.              2) Order only if the test result will change clinical management or necessary for a return to mission-critical employment decision.              3) Print and instruct patient to adhere to CDC home isolation program. (Link Above)              4) Set up or refer patient for a monitoring program.              5) Have patient sign up for and leverage MyChart (if not previously done). Order Specific Question:   Is this test for diagnosis or screening? Answer:   Diagnosis of ill patient     Order Specific Question:   Symptomatic for COVID-19 as defined by CDC? Answer:   Yes     Order Specific Question:   Date of Symptom Onset     Answer:   9/18/2021     Order Specific Question:   Hospitalized for COVID-19? Answer:   No     Order Specific Question:   Admitted to ICU for COVID-19? Answer:   No     Order Specific Question:   Employed in healthcare setting? Answer:   No     Order Specific Question:   Resident in a congregate (group) care setting? Answer:   No     Order Specific Question:   Previously tested for COVID-19? Answer: Yes    SARS-COV-2, COLTON 2 DAY TAT    REFERRAL TO GASTROENTEROLOGY     Referral Priority:   Routine     Referral Type:   Consultation     Referral Reason:   Specialty Services Required     Referred to Provider:   Helen Rosas MD     Number of Visits Requested:   1    AMB POC RAPID INFLUENZA TEST    AMB POC EKG ROUTINE W/ 12 LEADS, INTER & REP     Order Specific Question:   Reason for Exam:     Answer:   shortness of breath    PSEUDOEPHEDRINE-dextromethorphan-guaiFENesin (ROBITUSSIN-CE) 30- mg/5 mL solution     Sig: Take 5 mL by mouth four (4) times daily for 7 days.      Dispense:  140 mL     Refill:  0     lose weight, increase physical activity, follow low fat diet, follow low salt diet, routine labs ordered, call if any problems  Patient Instructions   Learning About Coronavirus (COVID-19)  Coronavirus (COVID-19): Overview  What is coronavirus (COVID-19)? The coronavirus disease (COVID-19) is caused by a virus. It is an illness that was first found in [place], [place], in December 2019. It has since spread worldwide. The virus can cause fever, cough, and trouble breathing. In severe cases, it can cause pneumonia and make it hard to breathe without help. It can cause death. Coronaviruses are a large group of viruses. They cause the common cold. They also cause more serious illnesses like Middle East respiratory syndrome (MERS) and severe acute respiratory syndrome (SARS). COVID-19 is caused by a novel coronavirus. That means it's a new type that has not been seen in people before. This virus spreads person-to-person through droplets from coughing and sneezing. It can also spread when you are close to someone who is infected. And it can spread when you touch something that has the virus on it, such as a doorknob or a tabletop. What can you do to protect yourself from coronavirus (COVID-19)? The best way to protect yourself from getting sick is to:  · Avoid areas where there is an outbreak. · Avoid contact with people who may be infected. · Wash your hands often with soap or alcohol-based hand sanitizers. · Avoid crowds and try to stay at least 6 feet away from other people. · Wash your hands often, especially after you cough or sneeze. Use soap and water, and scrub for at least 20 seconds. If soap and water aren't available, use an alcohol-based hand . · Avoid touching your mouth, nose, and eyes. What can you do to avoid spreading the virus to others? To help avoid spreading the virus to others:  · Cover your mouth with a tissue when you cough or sneeze. Then throw the tissue in the trash.   · Use a disinfectant to clean things that you touch often. · Stay home if you are sick or have been exposed to the virus. Don't go to school, work, or public areas. And don't use public transportation. · If you are sick:  ? Leave your home only if you need to get medical care. But call the doctor's office first so they know you're coming. And wear a face mask, if you have one.  ? If you have a face mask, wear it whenever you're around other people. It can help stop the spread of the virus when you cough or sneeze. ? Clean and disinfect your home every day. Use household  and disinfectant wipes or sprays. Take special care to clean things that you grab with your hands. These include doorknobs, remote controls, phones, and handles on your refrigerator and microwave. And don't forget countertops, tabletops, bathrooms, and computer keyboards. When to call for help  Call 911 anytime you think you may need emergency care. For example, call if:  · You have severe trouble breathing. (You can't talk at all.)  · You have constant chest pain or pressure. · You are severely dizzy or lightheaded. · You are confused or can't think clearly. · Your face and lips have a blue color. · You pass out (lose consciousness) or are very hard to wake up. Call your doctor now if you develop symptoms such as:  · Shortness of breath. · Fever. · Cough. If you need to get care, call ahead to the doctor's office for instructions before you go. Make sure you wear a face mask, if you have one, to prevent exposing other people to the virus. Where can you get the latest information? The following health organizations are tracking and studying this virus. Their websites contain the most up-to-date information. [place] also learn what to do if you think you may have been exposed to the virus. · U.S. Centers for Disease Control and Prevention (CDC): The CDC provides updated news about the disease and travel advice.  The website also tells you how to prevent the spread of infection. www.cdc.gov  · World Health Organization [facility]): WHO offers information about the virus outbreaks. WHO also has travel advice. www.who.int  Current as of: April 1, 2020               Content Version: 12.4  © 7311-3857 [employer]. Care instructions adapted under license by your healthcare professional. If you have questions about a medical condition or this instruction, always ask your healthcare professional. [place] [address] any warranty or liability for your use of this information. Follow-up and Dispositions    · Return in about 3 months (around 12/22/2021) for obesity, sob.

## 2021-09-22 NOTE — PROGRESS NOTES
1. Have you been to the ER, urgent care clinic since your last visit? Hospitalized since your last visit? Yes When: 1 month ago Where: [facility] ER Reason for visit: Dizziness     2. Have you seen or consulted any other health care providers outside of the [address] [place] [address] since your last visit? Include any pap smears or colon screening. No     Chief Complaint   Patient presents with    New Patient    Breathing Problem    Dizziness    Addiction problem    Cough    Concern For COVID-19 (Coronavirus)    Generalized Body Aches         Pt states that he needs to establish a new PCP. States that he has SOB during activity. States that he is having problems with dizziness. States that this has been going on for year and it has gotten worse. States that he saw a cardiologist 2 years ago and found that the sac around his heart was inflamed. He says that he has not went back since. He says that he was seen at [facility]. States that he uses Meth on and off.      Cardiologist- [facility]

## 2021-09-24 LAB
SARS-COV-2, NAA 2 DAY TAT: NORMAL
SARS-COV-2, NAA: DETECTED

## 2021-09-24 NOTE — PROGRESS NOTES
I have called Lorelei Guerrier this morning about positive result. He says he feels ok, not too much cough, no sob, no fever, no myalgias. Has sore throat and some congestion. Revisited signs of alarm, to complete at least 10 days quarantine and notify possible close contacts.

## 2021-09-28 ENCOUNTER — TELEPHONE (OUTPATIENT)
Dept: INTERNAL MEDICINE CLINIC | Age: 46
End: 2021-09-28

## 2021-09-28 ENCOUNTER — HOSPITAL ENCOUNTER (EMERGENCY)
Age: 46
Discharge: HOME | End: 2021-09-28
Payer: COMMERCIAL

## 2021-09-28 ENCOUNTER — APPOINTMENT (OUTPATIENT)
Dept: GENERAL RADIOLOGY | Age: 46
End: 2021-09-28
Attending: PHYSICIAN ASSISTANT
Payer: COMMERCIAL

## 2021-09-28 VITALS
HEIGHT: 71 IN | RESPIRATION RATE: 18 BRPM | DIASTOLIC BLOOD PRESSURE: 90 MMHG | SYSTOLIC BLOOD PRESSURE: 124 MMHG | BODY MASS INDEX: 35.7 KG/M2 | HEART RATE: 88 BPM | WEIGHT: 255 LBS | OXYGEN SATURATION: 97 % | TEMPERATURE: 98.6 F

## 2021-09-28 DIAGNOSIS — U07.1 COVID-19 VIRUS INFECTION: Primary | ICD-10-CM

## 2021-09-28 DIAGNOSIS — E87.1 HYPONATREMIA: ICD-10-CM

## 2021-09-28 DIAGNOSIS — R51.9 ACUTE NONINTRACTABLE HEADACHE, UNSPECIFIED HEADACHE TYPE: ICD-10-CM

## 2021-09-28 LAB
ANION GAP SERPL CALC-SCNC: 9 MMOL/L (ref 5–15)
BASOPHILS # BLD: 0 K/UL (ref 0–0.1)
BASOPHILS NFR BLD: 0 % (ref 0–1)
BNP SERPL-MCNC: 42 PG/ML
BUN SERPL-MCNC: 11 MG/DL (ref 6–20)
BUN/CREAT SERPL: 11 (ref 12–20)
CA-I BLD-MCNC: 8 MG/DL (ref 8.5–10.1)
CHLORIDE SERPL-SCNC: 100 MMOL/L (ref 97–108)
CO2 SERPL-SCNC: 22 MMOL/L (ref 21–32)
CREAT SERPL-MCNC: 0.96 MG/DL (ref 0.7–1.3)
DIFFERENTIAL METHOD BLD: ABNORMAL
EOSINOPHIL # BLD: 0 K/UL (ref 0–0.4)
EOSINOPHIL NFR BLD: 0 % (ref 0–7)
ERYTHROCYTE [DISTWIDTH] IN BLOOD BY AUTOMATED COUNT: 13.7 % (ref 11.5–14.5)
GLUCOSE SERPL-MCNC: 103 MG/DL (ref 65–100)
HCT VFR BLD AUTO: 47.8 % (ref 36.6–50.3)
HGB BLD-MCNC: 16.2 G/DL (ref 12.1–17)
IMM GRANULOCYTES # BLD AUTO: 0 K/UL
IMM GRANULOCYTES NFR BLD AUTO: 0 %
LYMPHOCYTES # BLD: 1.7 K/UL (ref 0.8–3.5)
LYMPHOCYTES NFR BLD: 26 % (ref 12–49)
MCH RBC QN AUTO: 28.2 PG (ref 26–34)
MCHC RBC AUTO-ENTMCNC: 33.9 G/DL (ref 30–36.5)
MCV RBC AUTO: 83.3 FL (ref 80–99)
MONOCYTES # BLD: 0.6 K/UL (ref 0–1)
MONOCYTES NFR BLD: 9 % (ref 5–13)
NEUTS BAND NFR BLD MANUAL: 1 % (ref 0–6)
NEUTS SEG # BLD: 4.1 K/UL (ref 1.8–8)
NEUTS SEG NFR BLD: 64 % (ref 32–75)
NRBC # BLD: 0 K/UL (ref 0–0.01)
NRBC BLD-RTO: 0 PER 100 WBC
PLATELET # BLD AUTO: 226 K/UL (ref 150–400)
PMV BLD AUTO: 10.3 FL (ref 8.9–12.9)
POTASSIUM SERPL-SCNC: 4.3 MMOL/L (ref 3.5–5.1)
RBC # BLD AUTO: 5.74 M/UL (ref 4.1–5.7)
RBC MORPH BLD: ABNORMAL
SODIUM SERPL-SCNC: 131 MMOL/L (ref 136–145)
TROPONIN I SERPL-MCNC: <0.05 NG/ML
WBC # BLD AUTO: 6.4 K/UL (ref 4.1–11.1)

## 2021-09-28 PROCEDURE — 74011250636 HC RX REV CODE- 250/636: Performed by: PHYSICIAN ASSISTANT

## 2021-09-28 PROCEDURE — 85025 COMPLETE CBC W/AUTO DIFF WBC: CPT

## 2021-09-28 PROCEDURE — 83880 ASSAY OF NATRIURETIC PEPTIDE: CPT

## 2021-09-28 PROCEDURE — 71045 X-RAY EXAM CHEST 1 VIEW: CPT

## 2021-09-28 PROCEDURE — 36415 COLL VENOUS BLD VENIPUNCTURE: CPT

## 2021-09-28 PROCEDURE — 96374 THER/PROPH/DIAG INJ IV PUSH: CPT

## 2021-09-28 PROCEDURE — 84484 ASSAY OF TROPONIN QUANT: CPT

## 2021-09-28 PROCEDURE — 96375 TX/PRO/DX INJ NEW DRUG ADDON: CPT

## 2021-09-28 PROCEDURE — 99284 EMERGENCY DEPT VISIT MOD MDM: CPT

## 2021-09-28 PROCEDURE — 80048 BASIC METABOLIC PNL TOTAL CA: CPT

## 2021-09-28 PROCEDURE — 93005 ELECTROCARDIOGRAM TRACING: CPT

## 2021-09-28 RX ORDER — DEXAMETHASONE SODIUM PHOSPHATE 10 MG/ML
6 INJECTION INTRAMUSCULAR; INTRAVENOUS ONCE
Status: COMPLETED | OUTPATIENT
Start: 2021-09-28 | End: 2021-09-28

## 2021-09-28 RX ORDER — AZITHROMYCIN 250 MG/1
250 TABLET, FILM COATED ORAL SEE ADMIN INSTRUCTIONS
Qty: 6 TABLET | Refills: 0 | Status: SHIPPED | OUTPATIENT
Start: 2021-09-28 | End: 2021-10-04

## 2021-09-28 RX ORDER — KETOROLAC TROMETHAMINE 15 MG/ML
15 INJECTION, SOLUTION INTRAMUSCULAR; INTRAVENOUS ONCE
Status: COMPLETED | OUTPATIENT
Start: 2021-09-28 | End: 2021-09-28

## 2021-09-28 RX ORDER — BUTALBITAL, ACETAMINOPHEN AND CAFFEINE 50; 325; 40 MG/1; MG/1; MG/1
1 TABLET ORAL
Qty: 12 TABLET | Refills: 0 | Status: SHIPPED | OUTPATIENT
Start: 2021-09-28 | End: 2021-10-05

## 2021-09-28 RX ADMIN — DEXAMETHASONE SODIUM PHOSPHATE 6 MG: 10 INJECTION INTRAMUSCULAR; INTRAVENOUS at 15:50

## 2021-09-28 RX ADMIN — SODIUM CHLORIDE 1000 ML: 9 INJECTION, SOLUTION INTRAVENOUS at 15:21

## 2021-09-28 RX ADMIN — KETOROLAC TROMETHAMINE 15 MG: 15 INJECTION, SOLUTION INTRAMUSCULAR; INTRAVENOUS at 15:47

## 2021-09-28 NOTE — DISCHARGE INSTRUCTIONS
COVID-19 is suspected. Return to the emergency department for any worsening symptoms to include high fever, shortness of breath, chest pain. Purchase a pulse oximeter and keep checking your oxygen levels. Return to the emergency department for an oxygen level less than 90%. Do not lay in the bed for extended periods of time, get up and move around often. Quarantine away from family and friends for 10 days.

## 2021-09-28 NOTE — ED PROVIDER NOTES
EMERGENCY DEPARTMENT HISTORY AND PHYSICAL EXAM      Date: 9/28/2021  Patient Name: Luann Rutledge    History of Presenting Illness     Chief Complaint   Patient presents with    Positive For Covid-19       History Provided By: Patient    HPI: Luann Rutledge, 55 y.o. male with a past medical history significant No significant past medical history presents to the ED with cc of generalized headache, rated 8 out of 10 x 2 to 3 days, worsening. Patient reports that he tested positive for COVID-19 about 1 week ago. Associated complaints include chest congestion, nausea and diarrhea, and one episode of vomiting. Patient states that he is scheduled to be evaluated by [facility] cardiology for some ongoing shortness of breath. Family history of heart disease. He has not tried taking any medications for his symptoms however he has been pushing fluids. Patient denies any decreased appetite. He specifically denies chest pain, wheezing, shortness of breath, abdominal pain, fever, leg swelling, recent travel, head injury, blurred vision. There are no other complaints, changes, or physical findings at this time. PCP: Adia Puente MD    No current facility-administered medications on file prior to encounter. No current outpatient medications on file prior to encounter.        Past History     Past Medical History:  Past Medical History:   Diagnosis Date    Arrhythmia     Headache        Past Surgical History:  Past Surgical History:   Procedure Laterality Date    HX APPENDECTOMY         Family History:  Family History   Problem Relation Age of Onset    Cancer Mother     COPD Father     Heart Disease Father     Cancer Maternal Grandmother     Cancer Maternal Grandfather        Social History:  Social History     Tobacco Use    Smoking status: Never Smoker    Smokeless tobacco: Never Used   Vaping Use    Vaping Use: Never used   Substance Use Topics    Alcohol use: Not Currently    Drug use: Not Currently     Types: Methamphetamines     Comment: 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 HENT: Positive for congestion. Negative for ear pain, rhinorrhea and trouble swallowing. Eyes: Negative for pain and visual disturbance. Respiratory: Positive for cough. Negative for shortness of breath and wheezing. Cardiovascular: Negative for chest pain and leg swelling. Gastrointestinal: Positive for diarrhea, nausea and vomiting. Negative for abdominal pain. Genitourinary: Negative for decreased urine volume, difficulty urinating, dysuria and hematuria. Musculoskeletal: Negative for arthralgias and myalgias. Skin: Negative for rash. Neurological: Positive for headaches. Negative for dizziness and weakness. Hematological: Negative for adenopathy. Psychiatric/Behavioral: The patient is not nervous/anxious. All other systems reviewed and are negative. Physical Exam   Physical Exam  Vitals and nursing note reviewed. Constitutional:       General: He is not in acute distress. Appearance: He is well-developed. He is ill-appearing. HENT:      Head: Normocephalic and atraumatic. Nose: No congestion. Mouth/Throat:      Mouth: Mucous membranes are moist.   Eyes:      Extraocular Movements: Extraocular movements intact. Pupils: Pupils are equal, round, and reactive to light. Cardiovascular:      Rate and Rhythm: Normal rate and regular rhythm. Pulses: Normal pulses. Heart sounds: Normal heart sounds. Pulmonary:      Effort: Pulmonary effort is normal. No respiratory distress. Breath sounds: Normal breath sounds. No wheezing or rhonchi. Abdominal:      General: Abdomen is flat. Bowel sounds are normal.      Palpations: Abdomen is soft. Tenderness:  There is no abdominal tenderness. Musculoskeletal:      Right lower leg: No edema. Left lower leg: No edema. Skin:     General: Skin is warm and dry. Capillary Refill: Capillary refill takes less than 2 seconds. Findings: No rash. Neurological:      General: No focal deficit present. Mental Status: He is alert and oriented to person, place, and time. GCS: GCS eye subscore is 4. GCS verbal subscore is 5. GCS motor subscore is 6. Cranial Nerves: No cranial nerve deficit. Sensory: Sensation is intact. Motor: Motor function is intact. Coordination: Coordination is intact. Gait: Gait is intact. Psychiatric:         Mood and Affect: Mood normal.         Behavior: Behavior normal.         Diagnostic Study Results     Labs -     Recent Results (from the past 400 hour(s))   CBC WITH AUTOMATED DIFF    Collection Time: 09/28/21  3:20 PM   Result Value Ref Range    WBC 6.4 4.1 - 11.1 K/uL    RBC 5.74 (H) 4.10 - 5.70 M/uL    HGB 16.2 12.1 - 17.0 g/dL    HCT 47.8 36.6 - 50.3 %    MCV 83.3 80.0 - 99.0 FL    MCH 28.2 26.0 - 34.0 PG    MCHC 33.9 30.0 - 36.5 g/dL    RDW 13.7 11.5 - 14.5 %    PLATELET 976 790 - 964 K/uL    MPV 10.3 8.9 - 12.9 FL    NRBC 0.0 0.0  WBC    ABSOLUTE NRBC 0.00 0.00 - 0.01 K/uL    NEUTROPHILS 64 32 - 75 %    BAND NEUTROPHILS 1 0 - 6 %    LYMPHOCYTES 26 12 - 49 %    MONOCYTES 9 5 - 13 %    EOSINOPHILS 0 0 - 7 %    BASOPHILS 0 0 - 1 %    IMMATURE GRANULOCYTES 0 %    ABS. NEUTROPHILS 4.1 1.8 - 8.0 K/UL    ABS. LYMPHOCYTES 1.7 0.8 - 3.5 K/UL    ABS. MONOCYTES 0.6 0.0 - 1.0 K/UL    ABS. EOSINOPHILS 0.0 0.0 - 0.4 K/UL    ABS. BASOPHILS 0.0 0.0 - 0.1 K/UL    ABS. IMM.  GRANS. 0.0 K/UL    DF Manual      RBC COMMENTS Normocytic, Normochromic     METABOLIC PANEL, BASIC    Collection Time: 09/28/21  3:20 PM   Result Value Ref Range    Sodium 131 (L) 136 - 145 mmol/L    Potassium 4.3 3.5 - 5.1 mmol/L    Chloride 100 97 - 108 mmol/L    CO2 22 21 - 32 mmol/L    Anion gap 9 5 - 15 mmol/L    Glucose 103 (H) 65 - 100 mg/dL    BUN 11 6 - 20 mg/dL    Creatinine 0.96 0.70 - 1.30 mg/dL    BUN/Creatinine ratio 11 (L) 12 - 20      GFR est AA >60 >60 ml/min/1.73m2    GFR est non-AA >60 >60 ml/min/1.73m2    Calcium 8.0 (L) 8.5 - 10.1 mg/dL   TROPONIN I    Collection Time: 09/28/21  3:20 PM   Result Value Ref Range    Troponin-I, Qt. <0.05 <0.05 ng/mL   BNP    Collection Time: 09/28/21  3:20 PM   Result Value Ref Range    NT pro-BNP 42 <125 pg/mL   EKG, 12 LEAD, INITIAL    Collection Time: 09/28/21  3:46 PM   Result Value Ref Range    Ventricular Rate 98 BPM    Atrial Rate 98 BPM    P-R Interval 144 ms    QRS Duration 74 ms    Q-T Interval 330 ms    QTC Calculation (Bezet) 421 ms    Calculated P Axis 39 degrees    Calculated R Axis 13 degrees    Calculated T Axis 45 degrees    Diagnosis       Normal sinus rhythm  Normal ECG  When compared with ECG of 03-MAR-2020 12:37,  No significant change was found  Confirmed by Jhon Stoner ([id number]) on 9/29/2021 2:13:26 PM         Radiologic Studies -   Results from [place] encounter on 09/28/21    XR CHEST PORT    Narrative  Portable upright radiograph of the chest 3:22 p.m. compared with March 3, 2020. INDICATION: Cough. Heart size is within normal limits. Mild patchy infiltrates are seen in both  lungs. No effusion or pneumothorax. No acute bony findings. Impression  Mild patchy infiltrates in both lungs. CT Results  (Last 48 hours)    None          Medical Decision Making   I am the first provider for this patient. I reviewed the vital signs, available nursing notes, past medical history, past surgical history, family history and social history. Vital Signs-Reviewed the patient's vital signs.   Wt Readings from Last 3 Encounters:   09/28/21 115.7 kg (255 lb)   09/22/21 119.6 kg (263 lb 9.6 oz)   04/07/21 117.9 kg (260 lb)     Temp Readings from Last 3 Encounters:   09/28/21 98.6 °F (37 °C)   09/22/21 99.2 °F (37.3 °C) (Oral)   04/07/21 97.8 °F (36.6 °C)     BP Readings from Last 3 Encounters:   09/28/21 (!) 124/90   09/22/21 130/81   04/07/21 (!) 149/98     Pulse Readings from Last 3 Encounters:   09/28/21 88   09/22/21 100   04/07/21 88       No data found. Records Reviewed: Nursing Notes and Old Medical Records    EKG:  Normal sinus rhythm at 98 bpm, no STEMI, normal EKG, , read by Dr. Amanda Escobar at [id number]    Provider Notes (Medical Decision Making):     MDM  Number of Diagnoses or Management Options  Acute nonintractable headache, unspecified headache type  COVID-19 virus infection  Hyponatremia  Diagnosis management comments: 55-year-old male presenting with COVID-19 viral illness. Also complaining of dyspnea on exertion which she states has been ongoing and is to be followed by cardiology. His EKG is without any acute findings. Troponin negative. Chest x-ray with mild patchy infiltrates consistent with COVID-19. He is otherwise well-appearing, saturating 97% on room air, vital signs are stable. He was provided with IV fluids. He will be safely discharged home with close return precautions. Amount and/or Complexity of Data Reviewed  Clinical lab tests: ordered and reviewed  Tests in the radiology section of CPT®: ordered and reviewed  Review and summarize past medical records: yes        ED Course:   Initial assessment performed. The patients presenting problems have been discussed, and they are in agreement with the care plan formulated and outlined with them. I have encouraged them to ask questions as they arise throughout their visit. PROCEDURES    Procedures       Disposition     Disposition: DC- Adult Discharges: All of the diagnostic tests were reviewed and questions answered. Diagnosis, care plan and treatment options were discussed. The patient understands the instructions and will follow up as directed. The patients results have been reviewed with them.   They have been counseled regarding their diagnosis. The patient verbally convey understanding and agreement of the signs, symptoms, diagnosis, treatment and prognosis and additionally agrees to follow up as recommended with their PCP in 24 - 48 hours. They also agree with the care-plan and convey that all of their questions have been answered. I have also put together some discharge instructions for them that include: 1) educational information regarding their diagnosis, 2) how to care for their diagnosis at home, as well a 3) list of reasons why they would want to return to the ED prior to their follow-up appointment, should their condition change. DISCHARGE PLAN:  1. Current Discharge Medication List      CONTINUE these medications which have NOT CHANGED    Details   PSEUDOEPHEDRINE-dextromethorphan-guaiFENesin (ROBITUSSIN-CE) 30- mg/5 mL solution Take 5 mL by mouth four (4) times daily for 7 days. Qty: 140 mL, Refills: 0    Associated Diagnoses: Viral URI with cough           2. Follow-up Information     Follow up With Specialties Details Why Niurka Walsh MD Internal Medicine Schedule an appointment as soon as possible for a visit  for follow up from ER visit [address] [place] [address]  [phone]      [facility] EMERGENCY DEPT Emergency Medicine  As needed, If symptoms worsen [id number] [id number]  [phone]        3. Return to ED if worse   4. Discharge Medication List as of 9/28/2021  5:52 PM      START taking these medications    Details   azithromycin (Zithromax Z-Clark) 250 mg tablet Take 1 Tablet by mouth See Admin Instructions for 6 days. Follow instructions on package, Normal, Disp-6 Tablet, R-0      butalbital-acetaminophen-caffeine (FIORICET, ESGIC) -40 mg per tablet Take 1 Tablet by mouth every six (6) hours as needed for Headache for up to 7 days. , Normal, Disp-12 Tablet, R-0         CONTINUE these medications which have NOT CHANGED    Details   PSEUDOEPHEDRINE-dextromethorphan-guaiFENesin (ROBITUSSIN-CE) 30- mg/5 mL solution Take 5 mL by mouth four (4) times daily for 7 days. , Normal, Disp-140 mL, R-0             Diagnosis     Clinical Impression:   1. COVID-19 virus infection    2. Hyponatremia    3.  Acute nonintractable headache, unspecified headache type

## 2021-09-28 NOTE — TELEPHONE ENCOUNTER
Gordon Fisher his girlfriend called, telling me Promise Roman is not doing well, this past weekend he has been worsening, having fever, nausea, vomiting and today he's very weak, difficult to get up from bed. He has current covid illness, on Friday he was doing well, I called him immediately, he tells me he has vomited today, feels lightheaded, weak and has a fever, no sob.  I have instructed him to go to the ER right now, his girlfriend Is going to pick him up, she's 10 minutes away

## 2021-09-29 ENCOUNTER — PATIENT OUTREACH (OUTPATIENT)
Dept: CASE MANAGEMENT | Age: 46
End: 2021-09-29

## 2021-09-29 LAB
ATRIAL RATE: 98 BPM
CALCULATED P AXIS, ECG09: 39 DEGREES
CALCULATED R AXIS, ECG10: 13 DEGREES
CALCULATED T AXIS, ECG11: 45 DEGREES
DIAGNOSIS, 93000: NORMAL
P-R INTERVAL, ECG05: 144 MS
Q-T INTERVAL, ECG07: 330 MS
QRS DURATION, ECG06: 74 MS
QTC CALCULATION (BEZET), ECG08: 421 MS
VENTRICULAR RATE, ECG03: 98 BPM

## 2021-10-01 ENCOUNTER — TELEPHONE (OUTPATIENT)
Dept: INTERNAL MEDICINE CLINIC | Age: 46
End: 2021-10-01

## 2021-10-01 DIAGNOSIS — J06.9 VIRAL URI WITH COUGH: Primary | ICD-10-CM

## 2021-10-01 DIAGNOSIS — U07.1 COVID-19: ICD-10-CM

## 2021-10-01 NOTE — TELEPHONE ENCOUNTER
I spoke to Vlad Giron over the phone today, he went to ER on 9/28, was sent home, note reviewed, he's feeling better, no rmore nausea, vomiting, has more energy, no sob, the cough is still there worse when he takes a deep breath. Resending cough medication, explained cough will gradually improve, but will linger around for several weeks.

## 2021-10-07 ENCOUNTER — TELEPHONE (OUTPATIENT)
Dept: INTERNAL MEDICINE CLINIC | Age: 46
End: 2021-10-07

## 2021-10-07 DIAGNOSIS — R06.02 SOB (SHORTNESS OF BREATH) ON EXERTION: Primary | ICD-10-CM

## 2021-10-07 NOTE — TELEPHONE ENCOUNTER
Pt called and stated that he is having problems with feeling SOB with activity. He also stated that he was prescribed a z-sarah and forgot to take all of the medication. He said that he only took 2 doses. He wants to know what to do about this.

## 2022-03-15 ENCOUNTER — HOSPITAL ENCOUNTER (OUTPATIENT)
Dept: NON INVASIVE DIAGNOSTICS | Age: 47
Discharge: HOME | End: 2022-03-15
Attending: INTERNAL MEDICINE
Payer: COMMERCIAL

## 2022-03-15 DIAGNOSIS — R00.2 INTERMITTENT PALPITATIONS: ICD-10-CM

## 2022-03-15 DIAGNOSIS — F15.10 METHAMPHETAMINE ABUSE, EPISODIC (HCC): ICD-10-CM

## 2022-03-15 DIAGNOSIS — R06.02 SOB (SHORTNESS OF BREATH): ICD-10-CM

## 2022-03-15 LAB
ECHO AO ROOT DIAM: 4.4 CM
ECHO AV AREA PEAK VELOCITY: 3.2 CM2
ECHO AV AREA VTI: 3.1 CM2
ECHO AV MEAN GRADIENT: 4 MMHG
ECHO AV MEAN VELOCITY: 0.9 M/S
ECHO AV PEAK GRADIENT: 8 MMHG
ECHO AV PEAK VELOCITY: 1.4 M/S
ECHO AV VELOCITY RATIO: 0.71
ECHO AV VTI: 26.7 CM
ECHO EST RA PRESSURE: 3 MMHG
ECHO LA AREA 2C: 14.8 CM2
ECHO LA AREA 4C: 13.6 CM2
ECHO LA DIAMETER: 3.9 CM
ECHO LA MAJOR AXIS: 5 CM
ECHO LA MINOR AXIS: 4.4 CM
ECHO LA TO AORTIC ROOT RATIO: 0.89
ECHO LA VOL BP: 37 ML (ref 18–58)
ECHO LV E' LATERAL VELOCITY: 8 CM/S
ECHO LV E' SEPTAL VELOCITY: 6 CM/S
ECHO LV EDV A2C: 70 ML
ECHO LV EDV A4C: 86 ML
ECHO LV EJECTION FRACTION A2C: 67 %
ECHO LV EJECTION FRACTION A4C: 58 %
ECHO LV EJECTION FRACTION BIPLANE: 65 % (ref 55–100)
ECHO LV ESV A2C: 23 ML
ECHO LV ESV A4C: 35 ML
ECHO LV FRACTIONAL SHORTENING: 24 % (ref 28–44)
ECHO LV INTERNAL DIMENSION DIASTOLIC: 5.1 CM (ref 4.2–5.9)
ECHO LV INTERNAL DIMENSION SYSTOLIC: 3.9 CM
ECHO LV IVSD: 1.1 CM (ref 0.6–1)
ECHO LV MASS 2D: 213.9 G (ref 88–224)
ECHO LV POSTERIOR WALL DIASTOLIC: 1.1 CM (ref 0.6–1)
ECHO LV RELATIVE WALL THICKNESS RATIO: 0.43
ECHO LVOT AREA: 4.5 CM2
ECHO LVOT AV VTI INDEX: 0.69
ECHO LVOT DIAM: 2.4 CM
ECHO LVOT MEAN GRADIENT: 2 MMHG
ECHO LVOT PEAK GRADIENT: 4 MMHG
ECHO LVOT PEAK VELOCITY: 1 M/S
ECHO LVOT SV: 83.6 ML
ECHO LVOT VTI: 18.5 CM
ECHO MV A VELOCITY: 0.49 M/S
ECHO MV E VELOCITY: 0.43 M/S
ECHO MV E/A RATIO: 0.88
ECHO MV E/E' LATERAL: 5.38
ECHO MV E/E' RATIO (AVERAGED): 6.27
ECHO MV E/E' SEPTAL: 7.17
ECHO MV REGURGITANT PEAK GRADIENT: 36 MMHG
ECHO MV REGURGITANT PEAK VELOCITY: 3 M/S
ECHO MV REGURGITANT VTIA: 91.8 CM
ECHO PV MAX VELOCITY: 0.9 M/S
ECHO PV MEAN GRADIENT: 2 MMHG
ECHO PV MEAN VELOCITY: 0.7 M/S
ECHO PV PEAK GRADIENT: 3 MMHG
ECHO PV VTI: 17.4 CM
ECHO RIGHT VENTRICULAR SYSTOLIC PRESSURE (RVSP): 13 MMHG
ECHO RV BASAL DIMENSION: 3.9 CM
ECHO RV FREE WALL PEAK S': 11 CM/S
ECHO RV MID DIMENSION: 3.3 CM
ECHO RV TAPSE: 2.5 CM (ref 1.5–2)
ECHO TV REGURGITANT MAX VELOCITY: 1.59 M/S
ECHO TV REGURGITANT PEAK GRADIENT: 10 MMHG

## 2022-03-15 PROCEDURE — 93306 TTE W/DOPPLER COMPLETE: CPT

## 2022-03-18 PROBLEM — R06.02 SOB (SHORTNESS OF BREATH): Status: ACTIVE | Noted: 2021-09-22

## 2022-03-18 PROBLEM — R00.2 INTERMITTENT PALPITATIONS: Status: ACTIVE | Noted: 2021-09-22

## 2022-03-19 PROBLEM — F15.10 METHAMPHETAMINE ABUSE, EPISODIC (HCC): Status: ACTIVE | Noted: 2021-09-22

## 2022-03-19 PROBLEM — E66.9 OBESITY (BMI 35.0-39.9 WITHOUT COMORBIDITY): Status: ACTIVE | Noted: 2021-09-22

## 2022-06-30 PROBLEM — Z86.16 HISTORY OF COVID-19: Status: ACTIVE | Noted: 2022-06-30

## 2022-06-30 PROBLEM — R03.0 ELEVATED BLOOD-PRESSURE READING WITHOUT DIAGNOSIS OF HYPERTENSION: Status: ACTIVE | Noted: 2022-06-30

## 2022-07-01 ENCOUNTER — OFFICE VISIT (OUTPATIENT)
Dept: INTERNAL MEDICINE CLINIC | Age: 47
End: 2022-07-01
Payer: COMMERCIAL

## 2022-07-01 ENCOUNTER — APPOINTMENT (OUTPATIENT)
Dept: GENERAL RADIOLOGY | Age: 47
End: 2022-07-01
Attending: EMERGENCY MEDICINE
Payer: COMMERCIAL

## 2022-07-01 ENCOUNTER — HOSPITAL ENCOUNTER (EMERGENCY)
Age: 47
Discharge: HOME | End: 2022-07-01
Attending: EMERGENCY MEDICINE
Payer: COMMERCIAL

## 2022-07-01 VITALS
BODY MASS INDEX: 37.6 KG/M2 | SYSTOLIC BLOOD PRESSURE: 110 MMHG | HEIGHT: 71 IN | DIASTOLIC BLOOD PRESSURE: 80 MMHG | OXYGEN SATURATION: 97 % | TEMPERATURE: 98.2 F | HEART RATE: 72 BPM | RESPIRATION RATE: 20 BRPM | WEIGHT: 268.6 LBS

## 2022-07-01 VITALS
TEMPERATURE: 98.3 F | HEART RATE: 70 BPM | BODY MASS INDEX: 37.52 KG/M2 | HEIGHT: 71 IN | OXYGEN SATURATION: 98 % | DIASTOLIC BLOOD PRESSURE: 88 MMHG | SYSTOLIC BLOOD PRESSURE: 135 MMHG | RESPIRATION RATE: 20 BRPM | WEIGHT: 268 LBS

## 2022-07-01 DIAGNOSIS — R05.9 COUGH: ICD-10-CM

## 2022-07-01 DIAGNOSIS — R06.02 SOB (SHORTNESS OF BREATH): Primary | ICD-10-CM

## 2022-07-01 DIAGNOSIS — R06.02 SOB (SHORTNESS OF BREATH): ICD-10-CM

## 2022-07-01 DIAGNOSIS — R42 DIZZINESS: ICD-10-CM

## 2022-07-01 DIAGNOSIS — F15.10 METHAMPHETAMINE ABUSE, EPISODIC (HCC): ICD-10-CM

## 2022-07-01 DIAGNOSIS — R07.9 CHEST PAIN, UNSPECIFIED TYPE: ICD-10-CM

## 2022-07-01 DIAGNOSIS — Z86.16 HISTORY OF COVID-19: ICD-10-CM

## 2022-07-01 DIAGNOSIS — E66.9 OBESITY (BMI 35.0-39.9 WITHOUT COMORBIDITY): ICD-10-CM

## 2022-07-01 LAB
ALBUMIN SERPL-MCNC: 3.4 G/DL (ref 3.5–5)
ALBUMIN/GLOB SERPL: 1.1 {RATIO} (ref 1.1–2.2)
ALP SERPL-CCNC: 52 U/L (ref 45–117)
ALT SERPL-CCNC: 25 U/L (ref 12–78)
ANION GAP SERPL CALC-SCNC: 7 MMOL/L (ref 5–15)
AST SERPL W P-5'-P-CCNC: 20 U/L (ref 15–37)
ATRIAL RATE: 74 BPM
BASOPHILS # BLD: 0.1 K/UL (ref 0–0.1)
BASOPHILS NFR BLD: 1 % (ref 0–1)
BILIRUB SERPL-MCNC: 0.7 MG/DL (ref 0.2–1)
BNP SERPL-MCNC: 74 PG/ML
BUN SERPL-MCNC: 16 MG/DL (ref 6–20)
BUN/CREAT SERPL: 15 (ref 12–20)
CA-I BLD-MCNC: 9 MG/DL (ref 8.5–10.1)
CALCULATED P AXIS, ECG09: 42 DEGREES
CALCULATED R AXIS, ECG10: 46 DEGREES
CALCULATED T AXIS, ECG11: 47 DEGREES
CHLORIDE SERPL-SCNC: 105 MMOL/L (ref 97–108)
CO2 SERPL-SCNC: 25 MMOL/L (ref 21–32)
CREAT SERPL-MCNC: 1.06 MG/DL (ref 0.7–1.3)
DIAGNOSIS, 93000: NORMAL
DIFFERENTIAL METHOD BLD: NORMAL
EOSINOPHIL # BLD: 0.2 K/UL (ref 0–0.4)
EOSINOPHIL NFR BLD: 3 % (ref 0–7)
ERYTHROCYTE [DISTWIDTH] IN BLOOD BY AUTOMATED COUNT: 13.5 % (ref 11.5–14.5)
GLOBULIN SER CALC-MCNC: 3 G/DL (ref 2–4)
GLUCOSE SERPL-MCNC: 97 MG/DL (ref 65–100)
HCT VFR BLD AUTO: 40.3 % (ref 36.6–50.3)
HGB BLD-MCNC: 13.6 G/DL (ref 12.1–17)
IMM GRANULOCYTES # BLD AUTO: 0 K/UL (ref 0–0.04)
IMM GRANULOCYTES NFR BLD AUTO: 0 % (ref 0–0.5)
LYMPHOCYTES # BLD: 2.4 K/UL (ref 0.8–3.5)
LYMPHOCYTES NFR BLD: 41 % (ref 12–49)
MCH RBC QN AUTO: 29.4 PG (ref 26–34)
MCHC RBC AUTO-ENTMCNC: 33.7 G/DL (ref 30–36.5)
MCV RBC AUTO: 87 FL (ref 80–99)
MONOCYTES # BLD: 0.4 K/UL (ref 0–1)
MONOCYTES NFR BLD: 6 % (ref 5–13)
NEUTS SEG # BLD: 2.9 K/UL (ref 1.8–8)
NEUTS SEG NFR BLD: 49 % (ref 32–75)
NRBC # BLD: 0 K/UL (ref 0–0.01)
NRBC BLD-RTO: 0 PER 100 WBC
P-R INTERVAL, ECG05: 160 MS
PLATELET # BLD AUTO: 273 K/UL (ref 150–400)
PMV BLD AUTO: 9.6 FL (ref 8.9–12.9)
POTASSIUM SERPL-SCNC: 4.2 MMOL/L (ref 3.5–5.1)
PROT SERPL-MCNC: 6.4 G/DL (ref 6.4–8.2)
Q-T INTERVAL, ECG07: 378 MS
QRS DURATION, ECG06: 88 MS
QTC CALCULATION (BEZET), ECG08: 419 MS
RBC # BLD AUTO: 4.63 M/UL (ref 4.1–5.7)
SODIUM SERPL-SCNC: 137 MMOL/L (ref 136–145)
TROPONIN-HIGH SENSITIVITY: 5 NG/L (ref 0–76)
TROPONIN-HIGH SENSITIVITY: 6 NG/L (ref 0–76)
VENTRICULAR RATE, ECG03: 74 BPM
WBC # BLD AUTO: 5.9 K/UL (ref 4.1–11.1)

## 2022-07-01 PROCEDURE — 83880 ASSAY OF NATRIURETIC PEPTIDE: CPT

## 2022-07-01 PROCEDURE — 71045 X-RAY EXAM CHEST 1 VIEW: CPT

## 2022-07-01 PROCEDURE — 99214 OFFICE O/P EST MOD 30 MIN: CPT | Performed by: INTERNAL MEDICINE

## 2022-07-01 PROCEDURE — 36415 COLL VENOUS BLD VENIPUNCTURE: CPT

## 2022-07-01 PROCEDURE — 85025 COMPLETE CBC W/AUTO DIFF WBC: CPT

## 2022-07-01 PROCEDURE — 80053 COMPREHEN METABOLIC PANEL: CPT

## 2022-07-01 PROCEDURE — 84484 ASSAY OF TROPONIN QUANT: CPT

## 2022-07-01 PROCEDURE — 99285 EMERGENCY DEPT VISIT HI MDM: CPT

## 2022-07-01 PROCEDURE — 93005 ELECTROCARDIOGRAM TRACING: CPT

## 2022-07-01 RX ORDER — ALBUTEROL SULFATE 90 UG/1
2 AEROSOL, METERED RESPIRATORY (INHALATION)
Qty: 18 G | Refills: 2 | Status: SHIPPED | OUTPATIENT
Start: 2022-07-01

## 2022-07-01 RX ORDER — FLUTICASONE PROPIONATE 50 MCG
2 SPRAY, SUSPENSION (ML) NASAL DAILY
Qty: 1 EACH | Refills: 2 | Status: SHIPPED | OUTPATIENT
Start: 2022-07-01

## 2022-07-01 RX ORDER — MINERAL OIL
180 ENEMA (ML) RECTAL DAILY
Qty: 30 TABLET | Refills: 2 | Status: SHIPPED | OUTPATIENT
Start: 2022-07-01

## 2022-07-01 NOTE — ED TRIAGE NOTES
Intermittent sharp left chest pain with persistent shortness of breath. Pt reports SOB with minimal exertion. Pt seen by PCP today for mod to severe asthma. PCP sent to rule out cardiac origin of SOB.

## 2022-07-01 NOTE — PROGRESS NOTES
Bertha Espinal is a 55 y.o. male and presents with Follow-up    Mr. Samantha Lopez used to see my colleague Dr. Tamiko Merchant. He had last time seen her in October 2021 at that time he was complaining of shortness of breath when he was climbing the ladders. He works as a . He says that he feels extremely fatigued and tired and sweaty and he cannot reach even to the mailbox. He has no history of depression or anxiety or panic attacks. Recently he had visited ER in September 2021 that he was positive for COVID PCR test and he has not taken COVID-vaccine. He says he has dry cough with clear sputum for last several months even before having. COVID. He does not think he has allergies but he is exposed to lots of paints and allergens. His father had history of myocardial infarction at the age of 77. No history of drinking alcohol. No history of smoking. He has history of taking methyl amphetamine off and on last time he had taken about 7 to 10 days ago once. And he had taken when he had some issue with his girlfriend in the past she used to consume regularly but not now. He says that he feels short of breath and also chest sharp chest pain yesterday off-and-on at night and he thought that he should go to ER that he postponed at that he is coming today morning and he says that he had sharp chest pain in the midsternal part before coming here. I had a long discussion regarding differential diagnosis I checked his blood pressure he was saying that he feels dizzy for last several months and I checked his blood pressure in sitting and standing position there is no orthostatic hypotension. He has no family history of pheochromocytoma or any other adrenal gland related medical problems. He says that otherwise he is in good health condition he has 2Q children who were in the room with him.   I told him it is better to go to ER and he said that he will do EKG along with his labs including troponin to the ER to rule out cardiac etiology for chest pain dizziness and severe fatigue and shortness of breath    It is likely that he might have asthma and his symptoms might have triggered by taking use of methamphetamine. I convinced him to try taking albuterol inhaler and fexofenadine and fluticasone nasal spray. He told me that taking albuterol made him jittery in the past recommended to try again if he is ruled out for cardiac etiology then he can try these medicines. Refer him to cardiologist.  If needed I will    Review of Systems    Review of Systems   Constitutional: Positive for malaise/fatigue. Extreme fatigue with sob,extreme low energy feels dizzi on ladder . Has not worked since one week ,works as    HENT:        H/o covid last francie not vaccinated for covid. Respiratory: Positive for cough and shortness of breath. Carring a child makes you sob and extreme dizzi and tired. ,walking mailbox makes him to stop to catch up breath   Cardiovascular: Positive for chest pain. No localized tenderness. chest pain mid central and lt sided had methamphetemine taken last week ,   Gastrointestinal: Negative. Genitourinary: Negative. Neurological: Positive for dizziness. Psychiatric/Behavioral: Negative. He denies for panic attacks.         Past Medical History:   Diagnosis Date    Arrhythmia     Asthma     Headache      Past Surgical History:   Procedure Laterality Date    HX APPENDECTOMY       Social History     Socioeconomic History    Marital status:    Tobacco Use    Smoking status: Never Smoker    Smokeless tobacco: Never Used   Vaping Use    Vaping Use: Never used   Substance and Sexual Activity    Alcohol use: Not Currently    Drug use: Not Currently     Types: Methamphetamines     Comment: used meth for 10 years states that he is still using this drug      Family History   Problem Relation Age of Onset   Carlee Her Cancer Mother     COPD Father     Heart Disease Father     Cancer Maternal Grandmother     Cancer Maternal Grandfather        No Known Allergies    Objective:  Visit Vitals  /80 (BP Patient Position: Standing, BP Cuff Size: Adult)   Pulse 72   Temp 98.2 °F (36.8 °C) (Temporal)   Resp 20   Ht 5' 11\" (1.803 m)   Wt 268 lb 9.6 oz (121.8 kg)   SpO2 97%   BMI 37.46 kg/m²       Physical Exam:   Constitutional: General Appearance: Pleasant but worried and anxious looking slight teary face. . Level of Distress: mildPsychiatric: Mental Status: normal mood and affect Orientation: to time, place, and person. ,normal eye contact. Head: Head: normocephalic and atraumatic. Eyes: Pupils: PERRLA. Sclerae: non-icteric. Neck: Neck: supple, trachea midline, and no masses. Lymph Nodes: no cervical LAD. Thyroid: no enlargement or nodules and non-tender. Lungs: Respiratory effort: no dyspnea. Auscultation: no wheezing, rales/crackles, or rhonchi and breath sounds normal and good air movement. Cardiovascular: Apical Impulse: not displaced. Heart Auscultation: normal S1 and S2; no murmurs, rubs, or gallops; and RRR. Neck vessels: no carotid bruits. Pulses including femoral / pedal: normal throughout. Abdomen: Bowel Sounds: normal. Inspection and Palpation: no tenderness, guarding, or masses and soft and non-distended. Liver: non-tender and no hepatomegaly. Spleen: non-tender and no splenomegaly. Musculoskeletal[de-identified] Extremities: no edema,no varicosities. No Calf tenderness. Neurologic: Gait and Station: normal gait and station. Motor Strength normal right and left. Skin: Inspection and palpation: no rash, lesions, or ulcer.    Chest wall questionable? localized tenderness around the left mid chest    Results for orders placed or performed during the hospital encounter of 03/15/22   ECHO ADULT COMPLETE   Result Value Ref Range    LV EDV A2C 70 mL    LV EDV A4C 86 mL    LV ESV A2C 23 mL    LV ESV A4C 35 mL    IVSd 1.1 (A) 0.6 - 1.0 cm    LVIDd 5.1 4.2 - 5.9 cm    LVIDs 3.9 cm    LVOT Diameter 2.4 cm    LVOT Mean Gradient 2 mmHg    LVOT VTI 18.5 cm    LVOT Peak Velocity 1.0 m/s    LVOT Peak Gradient 4 mmHg    LVPWd 1.1 (A) 0.6 - 1.0 cm    LV E' Lateral Velocity 8 cm/s    LV E' Septal Velocity 6 cm/s    LV Ejection Fraction A2C 67 %    LV Ejection Fraction A4C 58 %    EF BP 65 55 - 100 %    LVOT Area 4.5 cm2    LVOT SV 83.6 ml    LA Minor Axis 4.4 cm    LA Major Axis 5.0 cm    LA Area 2C 14.8 cm2    LA Area 4C 13.6 cm2    LA Volume BP 37 18 - 58 mL    LA Diameter 3.9 cm    AV Mean Gradient 4 mmHg    AV VTI 26.7 cm    AV Mean Velocity 0.9 m/s    AV Peak Velocity 1.4 m/s    AV Peak Gradient 8 mmHg    AV Area by VTI 3.1 cm2    AV Area by Peak Velocity 3.2 cm2    Aortic Root 4.4 cm    MR VTI 91.8 cm    MR Peak Velocity 3.0 m/s    MR Peak Gradient 36 mmHg    MV A Velocity 0.49 m/s    MV E Velocity 0.43 m/s    PV Mean Gradient 2 mmHg    PV VTI 17.4 cm    PV Mean Velocity 0.7 m/s    PV Max Velocity 0.9 m/s    PV Peak Gradient 3 mmHg    TR Max Velocity 1.59 m/s    TR Peak Gradient 10 mmHg    TAPSE 2.5 (A) 1.5 - 2.0 cm    Fractional Shortening 2D 24 28 - 44 %    LV RWT Ratio 0.43     LV Mass 2D 213.9 88 - 224 g    MV E/A 0.88     E/E' Ratio (Averaged) 6.27     E/E' Lateral 5.38     E/E' Septal 7.17     LA/AO Root Ratio 0.89     AV Velocity Ratio 0.71     LVOT:AV VTI Index 0.69     RV Basal Dimension 3.9 cm    RV Mid Dimension 3.3 cm    RV Free Wall Peak S' 11 cm/s    Est. RA Pressure 3 mmHg    RVSP 13 mmHg       Assessment/Plan:      ICD-10-CM ICD-9-CM    1. Dizziness  R42 780.4 REFERRAL TO CARDIOLOGY   2. SOB (shortness of breath)  R06.02 786.05 REFERRAL TO CARDIOLOGY   3. Cough  R05.9 786.2    4. Methamphetamine abuse, episodic (HCC)  F15.10 305.72    5. Obesity (BMI 35.0-39.9 without comorbidity)  E66.9 278.00    6.  History of COVID-19  Z86.16 V12.09      Orders Placed This Encounter    REFERRAL TO CARDIOLOGY     Referral Priority:   Routine     Referral Type:   Consultation     Referral Reason:   Specialty Services Required     Referred to Provider:   Bernard Lloyd MD     Number of Visits Requested:   1    fexofenadine (ALLEGRA) 180 mg tablet     Sig: Take 1 Tablet by mouth daily. Dispense:  30 Tablet     Refill:  2    fluticasone propionate (FLONASE) 50 mcg/actuation nasal spray     Si Sprays by Both Nostrils route daily. Dispense:  1 Each     Refill:  2    albuterol (PROVENTIL HFA, VENTOLIN HFA, PROAIR HFA) 90 mcg/actuation inhaler     Sig: Take 2 Puffs by inhalation every six (6) hours as needed for Wheezing or Shortness of Breath. Dispense:  18 g     Refill:  2     Mr. Emily Mane used to see Dr. Raymond Crabtree. He wants to see me and he wants to continue with me. For the future visit. He has concern for dizziness cough shortness of breath and extreme fatigue differential diagnosis explained including use of methamphetamine and he might have underlying reactive airway disease being a  and might be having asthma which is undiagnosed and pollens might trigger. He has no orthostatic hypotension that can explain his dizziness. Since she is complaining of chest pain and severe shortness of breath and he cannot go to even mailbox I sent him to ER and he also agreed to rule out cardiac etiology his father had myocardial infarction at the age of 77. No history of alcoholism no history of taking cocaine. Recommended to not to use methamphetamine which can cause increased risk of myocardial infarction increase risk for tachycardia palpitation hypertension and ischemic damage to the heart muscles. He lives with his girlfriend who has 2 children and he has also 2 children he is trying to bland all 4 children recently both her adjusting with each other. In the past he had some problem with his girlfriend and he took methamphetamine and he was using regularly but now he is using episodically and last use was about 1 and half week ago around [employer].     I reviewed the ER notes I reviewed his EKG and lab results, 2 sets of troponin is negative and EKG is unremarkable. X-ray chest is unremarkable. He had echocardiogram in March 2022 which was unremarkable. I had already given him fexofenadine fluticasone nasal spray and albuterol inhaler and follow-up in 6 weeks with me    He had history of COVID in September 2021 and otherwise is a healthy person his blood pressure was controlled but in the past visits his diastolic blood pressure was elevated. I have already referred him to cardiologist he might be benefit by doing stress test.  Reviewed the notes from ER physician that his heart score is 3. He does not have any panic attacks or anxiety spells. Clinically he might have mild intermittent asthma along with allergic rhinitis and underlying questionable panic attacks along with to rule out any cardiac etiology because of use of methamphetamine and family history of myocardial infarction. He will make appointment with cardiologist.    My nurse call the ER nurse and notified that he is coming to rule out any cardiac etiology. lose weight, follow low fat diet, follow low salt diet, continue present plan, call if any problems. There are no Patient Instructions on file for this visit. Follow-up and Dispositions    · Return in about 2 months (around 9/1/2022) for follow up for chronic condition waiting list in 6 weekswants to see mesent to er.

## 2022-07-01 NOTE — PROGRESS NOTES
1. \"Have you been to the ER, urgent care clinic since your last visit? Hospitalized since your last visit? \" No    2. \"Have you seen or consulted any other health care providers outside of the [address] [place] [address] since your last visit? \" No     3. For patients aged 39-70: Has the patient had a colonoscopy / FIT/ Cologuard? No      If the patient is female:    4. For patients aged 41-77: Has the patient had a mammogram within the past 2 years? NA - based on age or sex      11. For patients aged 21-65: Has the patient had a pap smear?  NA - based on age or sex     Visit Vitals  BP (!) 134/92 (BP 1 Location: Left arm)   Pulse 75   Temp 98.2 °F (36.8 °C) (Temporal)   Resp 20   Ht 5' 11\" (1.803 m)   Wt 268 lb 9.6 oz (121.8 kg)   SpO2 97%   BMI 37.46 kg/m²       Chief Complaint   Patient presents with    Follow-up

## 2022-07-01 NOTE — DISCHARGE INSTRUCTIONS
Thank you! Thank you for allowing me to care for you in the emergency department. It is my goal to provide you with excellent care. If you have not received excellent quality care, please ask to speak to the nurse manager. Please fill out the survey that will come to you by mail or email since we listen to your feedback! Below you will find a list of your tests from today's visit. Should you have any questions, please do not hesitate to call the emergency department. Labs  Recent Results (from the past 12 hour(s))   EKG, 12 LEAD, INITIAL    Collection Time: 07/01/22 11:57 AM   Result Value Ref Range    Ventricular Rate 74 BPM    Atrial Rate 74 BPM    P-R Interval 160 ms    QRS Duration 88 ms    Q-T Interval 378 ms    QTC Calculation (Bezet) 419 ms    Calculated P Axis 42 degrees    Calculated R Axis 46 degrees    Calculated T Axis 47 degrees    Diagnosis       Normal sinus rhythm  Normal ECG  When compared with ECG of 28-SEP-2021 15:46,  No significant change was found  Confirmed by KELSEY BRAVO [facility] ([id number]) on 7/1/2022 12:43:41 PM     CBC WITH AUTOMATED DIFF    Collection Time: 07/01/22  1:40 PM   Result Value Ref Range    WBC 5.9 4.1 - 11.1 K/uL    RBC 4.63 4.10 - 5.70 M/uL    HGB 13.6 12.1 - 17.0 g/dL    HCT 40.3 36.6 - 50.3 %    MCV 87.0 80.0 - 99.0 FL    MCH 29.4 26.0 - 34.0 PG    MCHC 33.7 30.0 - 36.5 g/dL    RDW 13.5 11.5 - 14.5 %    PLATELET 927 731 - 875 K/uL    MPV 9.6 8.9 - 12.9 FL    NRBC 0.0 0.0  WBC    ABSOLUTE NRBC 0.00 0.00 - 0.01 K/uL    NEUTROPHILS 49 32 - 75 %    LYMPHOCYTES 41 12 - 49 %    MONOCYTES 6 5 - 13 %    EOSINOPHILS 3 0 - 7 %    BASOPHILS 1 0 - 1 %    IMMATURE GRANULOCYTES 0 0 - 0.5 %    ABS. NEUTROPHILS 2.9 1.8 - 8.0 K/UL    ABS. LYMPHOCYTES 2.4 0.8 - 3.5 K/UL    ABS. MONOCYTES 0.4 0.0 - 1.0 K/UL    ABS. EOSINOPHILS 0.2 0.0 - 0.4 K/UL    ABS. BASOPHILS 0.1 0.0 - 0.1 K/UL    ABS. IMM.  GRANS. 0.0 0.00 - 0.04 K/UL    DF AUTOMATED     METABOLIC PANEL, COMPREHENSIVE Collection Time: 07/01/22  1:40 PM   Result Value Ref Range    Sodium 137 136 - 145 mmol/L    Potassium 4.2 3.5 - 5.1 mmol/L    Chloride 105 97 - 108 mmol/L    CO2 25 21 - 32 mmol/L    Anion gap 7 5 - 15 mmol/L    Glucose 97 65 - 100 mg/dL    BUN 16 6 - 20 mg/dL    Creatinine 1.06 0.70 - 1.30 mg/dL    BUN/Creatinine ratio 15 12 - 20      GFR est AA >60 >60 ml/min/1.73m2    GFR est non-AA >60 >60 ml/min/1.73m2    Calcium 9.0 8.5 - 10.1 mg/dL    Bilirubin, total 0.7 0.2 - 1.0 mg/dL    AST (SGOT) 20 15 - 37 U/L    ALT (SGPT) 25 12 - 78 U/L    Alk. phosphatase 52 45 - 117 U/L    Protein, total 6.4 6.4 - 8.2 g/dL    Albumin 3.4 (L) 3.5 - 5.0 g/dL    Globulin 3.0 2.0 - 4.0 g/dL    A-G Ratio 1.1 1.1 - 2.2     TROPONIN-HIGH SENSITIVITY    Collection Time: 07/01/22  1:40 PM   Result Value Ref Range    Troponin-High Sensitivity 5 0 - 76 ng/L   NT-PRO BNP    Collection Time: 07/01/22  1:40 PM   Result Value Ref Range    NT pro-BNP 74 <125 pg/mL   TROPONIN-HIGH SENSITIVITY    Collection Time: 07/01/22  4:14 PM   Result Value Ref Range    Troponin-High Sensitivity 6 0 - 76 ng/L       Radiologic Studies  XR CHEST PORT   Final Result   1. No acute disease           CT Results  (Last 48 hours)      None          CXR Results  (Last 48 hours)                 07/01/22 1218  XR CHEST PORT Final result    Impression:  1. No acute disease           Narrative:  INDICATION:  cp        Exam: Portable chest 1206. Comparison: 9/28/2021. Findings: Cardiomediastinal silhouette is within normal limits. Pulmonary   vasculature is not engorged. There are no focal parenchymal opacities,   effusions, or pneumothorax.                 ------------------------------------------------------------------------------------------------------------  The exam and treatment you received in the Emergency Department were for an urgent problem and are not intended as complete care.  It is important that you follow-up with a doctor, nurse practitioner, or physician assistant to:  (1) confirm your diagnosis,  (2) re-evaluation of changes in your illness and treatment, and  (3) for ongoing care. Please take your discharge instructions with you when you go to your follow-up appointment. If you have any problem arranging a follow-up appointment, contact the Emergency Department. If your symptoms become worse or you do not improve as expected and you are unable to reach your health care provider, please return to the Emergency Department. We are available 24 hours a day. If a prescription has been provided, please have it filled as soon as possible to prevent a delay in treatment. If you have any questions or reservations about taking the medication due to side effects or interactions with other medications, please call your primary care provider or contact the ER.

## 2022-07-01 NOTE — ED PROVIDER NOTES
EMERGENCY DEPARTMENT HISTORY AND PHYSICAL EXAM      Date: 7/1/2022  Patient Name: Robel Goddard      History of Presenting Illness     Chief Complaint   Patient presents with    Shortness of Breath       History Provided By: Patient    HPI: Robel Goddard, 55 y.o. male with a past medical history significant No significant past medical history presents to the ED with cc of fatigue, dizziness, chest pain, shortness of breath. Patient states for the past 2 weeks he has been very tired. He works as a  outside and whenever he goes up the ladder he gets very short of breath and tired and has to stop and wait for seconds before he can start his job. He will  his daughter and walk a few steps and feel very short of breath. Last night he started having twinges of sharp chest pain on the left side for a minute or 2. He waited until his primary care doctor appointment this morning and they directed him to the ER. Denies any recent cough, nausea, vomiting. Patient does have a 10-year history of meth use but states he has only used once in the past 6 months. There are no other complaints, changes, or physical findings at this time. PCP: Genesis Zhang MD    Current Outpatient Medications   Medication Sig Dispense Refill    fexofenadine (ALLEGRA) 180 mg tablet Take 1 Tablet by mouth daily. 30 Tablet 2    fluticasone propionate (FLONASE) 50 mcg/actuation nasal spray 2 Sprays by Both Nostrils route daily. 1 Each 2    albuterol (PROVENTIL HFA, VENTOLIN HFA, PROAIR HFA) 90 mcg/actuation inhaler Take 2 Puffs by inhalation every six (6) hours as needed for Wheezing or Shortness of Breath.  18 g 2       Past History   Past Medical History:  Past Medical History:   Diagnosis Date    Arrhythmia     Asthma     Headache        Past Surgical History:  Past Surgical History:   Procedure Laterality Date    HX APPENDECTOMY         Family History:  Family History   Problem Relation Age of Onset    Cancer Mother     COPD Father     Heart Disease Father     Cancer Maternal Grandmother     Cancer Maternal Grandfather        Social History:  Social History     Tobacco Use    Smoking status: Never Smoker    Smokeless tobacco: Never Used   Vaping Use    Vaping Use: Never used   Substance Use Topics    Alcohol use: Not Currently    Drug use: Not Currently     Types: Methamphetamines     Comment: used meth for 10 years states that he is still using this drug        Allergies:  No Known Allergies  Review of Systems   Review of Systems   Constitutional: Positive for fatigue. Negative for chills and fever. HENT: Negative for sore throat. Eyes: Negative for redness. Respiratory: Positive for shortness of breath. Cardiovascular: Positive for chest pain. Negative for leg swelling. Gastrointestinal: Negative for abdominal pain, nausea and vomiting. Genitourinary: Negative for flank pain. Musculoskeletal: Negative for myalgias. Skin: Negative for rash. Neurological: Negative for headaches. Physical Exam   Physical Exam  Vitals and nursing note reviewed. Constitutional:       General: He is not in acute distress. Appearance: Normal appearance. HENT:      Head: Normocephalic and atraumatic. Mouth/Throat:      Mouth: Mucous membranes are moist.   Eyes:      Extraocular Movements: Extraocular movements intact. Conjunctiva/sclera: Conjunctivae normal.   Cardiovascular:      Rate and Rhythm: Normal rate and regular rhythm. Comments: No edema  Pulmonary:      Effort: Pulmonary effort is normal. No respiratory distress. Breath sounds: Normal breath sounds. No wheezing, rhonchi or rales. Abdominal:      General: There is no distension. Palpations: Abdomen is soft. Tenderness: There is no abdominal tenderness. Musculoskeletal:         General: Normal range of motion. Cervical back: Normal range of motion.    Skin:     General: Skin is warm and dry.   Neurological:      General: No focal deficit present. Mental Status: He is alert and oriented to person, place, and time. Mental status is at baseline. Lab and Diagnostic Study Results   Labs -     Recent Results (from the past 12 hour(s))   EKG, 12 LEAD, INITIAL    Collection Time: 07/01/22 11:57 AM   Result Value Ref Range    Ventricular Rate 74 BPM    Atrial Rate 74 BPM    P-R Interval 160 ms    QRS Duration 88 ms    Q-T Interval 378 ms    QTC Calculation (Bezet) 419 ms    Calculated P Axis 42 degrees    Calculated R Axis 46 degrees    Calculated T Axis 47 degrees    Diagnosis       Normal sinus rhythm  Normal ECG  When compared with ECG of 28-SEP-2021 15:46,  No significant change was found  Confirmed by STANISLAV COLE MDLESH ([id number]) on 7/1/2022 12:43:41 PM     CBC WITH AUTOMATED DIFF    Collection Time: 07/01/22  1:40 PM   Result Value Ref Range    WBC 5.9 4.1 - 11.1 K/uL    RBC 4.63 4.10 - 5.70 M/uL    HGB 13.6 12.1 - 17.0 g/dL    HCT 40.3 36.6 - 50.3 %    MCV 87.0 80.0 - 99.0 FL    MCH 29.4 26.0 - 34.0 PG    MCHC 33.7 30.0 - 36.5 g/dL    RDW 13.5 11.5 - 14.5 %    PLATELET 325 429 - 143 K/uL    MPV 9.6 8.9 - 12.9 FL    NRBC 0.0 0.0  WBC    ABSOLUTE NRBC 0.00 0.00 - 0.01 K/uL    NEUTROPHILS 49 32 - 75 %    LYMPHOCYTES 41 12 - 49 %    MONOCYTES 6 5 - 13 %    EOSINOPHILS 3 0 - 7 %    BASOPHILS 1 0 - 1 %    IMMATURE GRANULOCYTES 0 0 - 0.5 %    ABS. NEUTROPHILS 2.9 1.8 - 8.0 K/UL    ABS. LYMPHOCYTES 2.4 0.8 - 3.5 K/UL    ABS. MONOCYTES 0.4 0.0 - 1.0 K/UL    ABS. EOSINOPHILS 0.2 0.0 - 0.4 K/UL    ABS. BASOPHILS 0.1 0.0 - 0.1 K/UL    ABS. IMM.  GRANS. 0.0 0.00 - 0.04 K/UL    DF AUTOMATED     METABOLIC PANEL, COMPREHENSIVE    Collection Time: 07/01/22  1:40 PM   Result Value Ref Range    Sodium 137 136 - 145 mmol/L    Potassium 4.2 3.5 - 5.1 mmol/L    Chloride 105 97 - 108 mmol/L    CO2 25 21 - 32 mmol/L    Anion gap 7 5 - 15 mmol/L    Glucose 97 65 - 100 mg/dL    BUN 16 6 - 20 mg/dL    Creatinine 1. 06 0.70 - 1.30 mg/dL    BUN/Creatinine ratio 15 12 - 20      GFR est AA >60 >60 ml/min/1.73m2    GFR est non-AA >60 >60 ml/min/1.73m2    Calcium 9.0 8.5 - 10.1 mg/dL    Bilirubin, total 0.7 0.2 - 1.0 mg/dL    AST (SGOT) 20 15 - 37 U/L    ALT (SGPT) 25 12 - 78 U/L    Alk. phosphatase 52 45 - 117 U/L    Protein, total 6.4 6.4 - 8.2 g/dL    Albumin 3.4 (L) 3.5 - 5.0 g/dL    Globulin 3.0 2.0 - 4.0 g/dL    A-G Ratio 1.1 1.1 - 2.2     TROPONIN-HIGH SENSITIVITY    Collection Time: 07/01/22  1:40 PM   Result Value Ref Range    Troponin-High Sensitivity 5 0 - 76 ng/L   NT-PRO BNP    Collection Time: 07/01/22  1:40 PM   Result Value Ref Range    NT pro-BNP 74 <125 pg/mL   TROPONIN-HIGH SENSITIVITY    Collection Time: 07/01/22  4:14 PM   Result Value Ref Range    Troponin-High Sensitivity 6 0 - 76 ng/L       Radiologic Studies -   [unfilled]  CT Results  (Last 48 hours)    None        CXR Results  (Last 48 hours)               07/01/22 1218  XR CHEST PORT Final result    Impression:  1. No acute disease           Narrative:  INDICATION:  cp        Exam: Portable chest 1206. Comparison: 9/28/2021. Findings: Cardiomediastinal silhouette is within normal limits. Pulmonary   vasculature is not engorged. There are no focal parenchymal opacities,   effusions, or pneumothorax. Medical Decision Making and ED Course   - I am the first and primary provider for this patient AND AM THE PRIMARY PROVIDER OF RECORD. I reviewed the vital signs, available nursing notes, past medical history, past surgical history, family history and social history. - Initial assessment performed. The patients presenting problems have been discussed, and the staff are in agreement with the care plan formulated and outlined with them. I have encouraged them to ask questions as they arise throughout their visit.     Differential Diagnosis & Medical Decision Making Provider Note:   51-year-old male with history of meth use presenting with intermittent chest pain, shortness of breath which is exertional in nature. He is asymptomatic at this time. ECG shows no acute abnormalities. Troponin negative x2. Heart score is 3. No infectious signs or symptoms. No electrolyte abnormalities. Given referral for cardiology for follow-up as patient would likely benefit from a stress test  HEART SCORE:   History: Moderately Suspicious  ECG: Normal  Age: Greater than 45 years - Less than 65 years  Risk Factors: 1 or 2 risk factors  Troponin: Less than or equal to normal limit   HEART Score Total : 3     Management   Scores 0-3: 0.9-1.7% risk of adverse cardiac event. In the HEART Score study, these patients were discharged (0.99% in the retrospective study, 1.7% in the prospective study)   Scores 4-6: 12-16.6% risk of adverse cardiac event. In the HEART Score study, these patients were admitted to the hospital. (11.6% retrospective, 16.6% prospective)   Scores =7: 50-65% risk of adverse cardiac event. In the HEART Score study, these patients were candidates for early invasive measures. (65.2% retrospective, 50.1% prospective)   A MACE (Major Adverse Cardiac Event) was defined as all-cause mortality, myocardial infarction, or coronary revascularization. Original/Primary Reference  Alexander PIMENTEL, Ajay ZACARIAS, Matilda WARE. Chest pain in the emergency room: value of the HEART score. Neth Heart J. 2008;16(6):191-6. Validation  Alexander Alcantar, Matilda WARE, et al. A prospective validation of the HEART score for chest pain patients at the emergency department. Int J Cardiol. 2013;168(3):2153-8. Alexander Alcantar, Anderson Bedolla et al. Chest pain in the emergency room: a multicenter validation of the HEART Score. Crit Pathw Cardiol. 2010;9(3):164-9. Danielito AMY, Craig RF, Amanda BC, et al. The HEART Pathway Randomized Controlled Trial One Year Outcomes. Acad Emerg Med. 2018;           MDM     Vital Signs-Reviewed the patient's vital signs.   Patient Vitals for the past 12 hrs:   Temp Pulse Resp BP SpO2   07/01/22 1232 -- -- -- -- 98 %   07/01/22 1203 98.3 °F (36.8 °C) 70 20 135/88 98 %       EKG interpretation: (Preliminary): Performed at 1157. EKG Interpreted by me. Shows 1201  NSR rate 74, normal axis, normal intervals, no ST elevation or depression, no T wave versions, QTc 419    ED Course:          Disposition   Disposition: DC- Adult Discharges: All of the diagnostic tests were reviewed and questions answered. Diagnosis, care plan and treatment options were discussed. The patient understands the instructions and will follow up as directed. The patients results have been reviewed with them. They have been counseled regarding their diagnosis. The patient verbally convey understanding and agreement of the signs, symptoms, diagnosis, treatment and prognosis and additionally agrees to follow up as recommended with their PCP in 24 - 48 hours. They also agree with the care-plan and convey that all of their questions have been answered. I have also put together some discharge instructions for them that include: 1) educational information regarding their diagnosis, 2) how to care for their diagnosis at home, as well a 3) list of reasons why they would want to return to the ED prior to their follow-up appointment, should their condition change. Discharged    DISCHARGE PLAN:  1. Current Discharge Medication List      CONTINUE these medications which have NOT CHANGED    Details   fexofenadine (ALLEGRA) 180 mg tablet Take 1 Tablet by mouth daily. Qty: 30 Tablet, Refills: 2      fluticasone propionate (FLONASE) 50 mcg/actuation nasal spray 2 Sprays by Both Nostrils route daily. Qty: 1 Each, Refills: 2      albuterol (PROVENTIL HFA, VENTOLIN HFA, PROAIR HFA) 90 mcg/actuation inhaler Take 2 Puffs by inhalation every six (6) hours as needed for Wheezing or Shortness of Breath. Qty: 18 g, Refills: 2           2.    Follow-up Information     Follow up With Specialties Details Why Contact Info    Reji Mooney, [facility] Vascular Surgery, Cardiovascular Disease Physician, Interventional Cardiology Physician   [address] [address]  [phone]          3. Return to ED if worse   4. Discharge Medication List as of 7/1/2022  5:35 PM            Diagnosis/Clinical Impression     Clinical Impression:   1. SOB (shortness of breath)    2. Chest pain, unspecified type        Attestations:  Gi Dunbar MD    Please note that this dictation was completed with [employer], the computer voice recognition software. Quite often unanticipated grammatical, syntax, homophones, and other interpretive errors are inadvertently transcribed by the computer software. Please disregard these errors. Please excuse any errors that have escaped final proofreading. Thank you.

## 2022-07-29 ENCOUNTER — TELEPHONE (OUTPATIENT)
Dept: INTERNAL MEDICINE CLINIC | Age: 47
End: 2022-07-29

## 2022-07-29 NOTE — TELEPHONE ENCOUNTER
Pt states he has been having what feels like an anxiety attack with the chest pressure and the feeling of anxiety. Pt has a stress test scheduled but is hoping to be seen and giving something for it.

## 2022-07-31 PROBLEM — R05.9 COUGH: Status: RESOLVED | Noted: 2022-07-01 | Resolved: 2022-07-31

## 2022-10-02 PROBLEM — Z12.11 SCREENING FOR COLON CANCER: Status: ACTIVE | Noted: 2022-10-02

## 2022-10-02 PROBLEM — E66.01 MORBID OBESITY (HCC): Status: ACTIVE | Noted: 2022-10-02

## 2022-10-02 PROBLEM — Z13.220 SCREENING FOR LIPID DISORDERS: Status: ACTIVE | Noted: 2022-10-02

## 2022-10-02 PROBLEM — Z11.59 NEED FOR HEPATITIS C SCREENING TEST: Status: ACTIVE | Noted: 2022-10-02

## 2022-10-02 PROBLEM — R05.9 COUGH: Status: RESOLVED | Noted: 2022-07-01 | Resolved: 2022-10-02

## 2022-10-06 ENCOUNTER — TRANSCRIBE ORDER (OUTPATIENT)
Dept: SCHEDULING | Age: 47
End: 2022-10-06

## 2022-10-06 DIAGNOSIS — R42 DIZZINESS: Primary | ICD-10-CM

## 2022-10-06 DIAGNOSIS — I77.810 AORTIC ROOT DILATATION (HCC): Primary | ICD-10-CM

## 2022-11-01 PROBLEM — Z11.59 NEED FOR HEPATITIS C SCREENING TEST: Status: RESOLVED | Noted: 2022-10-02 | Resolved: 2022-11-01

## 2022-11-01 PROBLEM — Z12.11 SCREENING FOR COLON CANCER: Status: RESOLVED | Noted: 2022-10-02 | Resolved: 2022-11-01

## 2022-11-15 ENCOUNTER — HOSPITAL ENCOUNTER (OUTPATIENT)
Dept: CT IMAGING | Age: 47
Discharge: HOME | End: 2022-11-15
Attending: INTERNAL MEDICINE
Payer: COMMERCIAL

## 2022-11-15 DIAGNOSIS — I77.810 AORTIC ROOT DILATATION (HCC): ICD-10-CM

## 2022-11-15 DIAGNOSIS — R42 DIZZINESS: ICD-10-CM

## 2022-11-15 PROCEDURE — 70498 CT ANGIOGRAPHY NECK: CPT

## 2022-11-15 PROCEDURE — 74011000636 HC RX REV CODE- 636: Performed by: INTERNAL MEDICINE

## 2022-11-15 RX ADMIN — IOPAMIDOL 100 ML: 755 INJECTION, SOLUTION INTRAVENOUS at 14:43

## 2022-11-15 NOTE — PROGRESS NOTES
11/15/22 1435   Peripheral IV 11/15/22 Distal;Right Basilic   Placement Date/Time: 11/15/22 1435   Number of Attempts: 1  Inserted By: Darrel   Size: 20 G  Orientation: Distal;Right  Location: Basilic   Site Assessment Clean, dry, & intact   Phlebitis Assessment 0   Infiltration Assessment 0   Dressing Status Clean, dry, & intact   Dressing Type Transparent   Hub Color/Line Status Pink   Alcohol Cap Used Yes   Piv started with ultrasound. Brisk blood return pt tolerated well.

## 2022-11-17 ENCOUNTER — TELEPHONE (OUTPATIENT)
Dept: INTERNAL MEDICINE CLINIC | Age: 47
End: 2022-11-17

## 2022-11-17 DIAGNOSIS — I72.0 CAROTID ARTERY ANEURYSM (HCC): Primary | ICD-10-CM

## 2022-11-17 NOTE — TELEPHONE ENCOUNTER
Pt calls states that he got his lab results that states he has an aneurysm. He is  trying to get a early appointment so that the steps to treatment are sooner than later.

## 2022-11-21 ENCOUNTER — OFFICE VISIT (OUTPATIENT)
Dept: SURGERY | Age: 47
End: 2022-11-21
Payer: COMMERCIAL

## 2022-11-21 VITALS
HEART RATE: 90 BPM | TEMPERATURE: 98 F | OXYGEN SATURATION: 98 % | DIASTOLIC BLOOD PRESSURE: 100 MMHG | RESPIRATION RATE: 20 BRPM | WEIGHT: 250.5 LBS | BODY MASS INDEX: 34.94 KG/M2 | SYSTOLIC BLOOD PRESSURE: 154 MMHG

## 2022-11-21 DIAGNOSIS — I72.0 CAROTID ANEURYSM, RIGHT (HCC): ICD-10-CM

## 2022-11-21 DIAGNOSIS — R42 DIZZINESS: Primary | ICD-10-CM

## 2022-11-21 PROCEDURE — 99203 OFFICE O/P NEW LOW 30 MIN: CPT | Performed by: SURGERY

## 2022-11-21 RX ORDER — MONTELUKAST SODIUM 10 MG/1
10 TABLET ORAL
COMMUNITY
Start: 2022-11-04

## 2022-11-21 NOTE — PROGRESS NOTES
Chief Complaint   Patient presents with    New Patient     Discuss aneurysm    Visit Vitals  BP (!) 154/100 (BP 1 Location: Right upper arm, BP Patient Position: Sitting, BP Cuff Size: Large adult)   Pulse 90   Temp 98 °F (36.7 °C) (Temporal)   Resp 20   Wt 250 lb 8 oz (113.6 kg)   SpO2 98%   BMI 34.94 kg/m²    1. Have you been to the ER, urgent care clinic since your last visit? Hospitalized since your last visit? No    2. Have you seen or consulted any other health care providers outside of the [address] [place] [address] since your last visit? Include any pap smears or colon screening. No  Pt says he is very nervous about this visit. He was told he has an aneurysm. He did not sleep last night and he is not surprised about his blood pressure being up today.

## 2022-11-24 NOTE — PROGRESS NOTES
Vascular History and Physical    Patient: Anni Brito  MRN: [id number]    YOB: 1975  Age: 52 y.o. Sex: male     Chief Complaint:  Chief Complaint   Patient presents with    New Patient     Discuss aneurysm       History of Present Illness: Anni Brito is a 52 y.o. very pleasant gentleman recently had episodes of frequent dizzy spells. 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 Social History:  Social Connections: Not on file       Past Medical History:  Past Medical History:   Diagnosis Date    Arrhythmia     Asthma     Headache        Surgical History:  Past Surgical History:   Procedure Laterality Date    HX APPENDECTOMY         Allergies:  No Known Allergies    Current Meds:  Current Outpatient Medications   Medication Sig Dispense Refill    montelukast (SINGULAIR) 10 mg tablet Take 10 mg by mouth nightly. fexofenadine (ALLEGRA) 180 mg tablet Take 1 Tablet by mouth daily. 30 Tablet 2    fluticasone propionate (FLONASE) 50 mcg/actuation nasal spray 2 Sprays by Both Nostrils route daily. 1 Each 2    albuterol (PROVENTIL HFA, VENTOLIN HFA, PROAIR HFA) 90 mcg/actuation inhaler Take 2 Puffs by inhalation every six (6) hours as needed for Wheezing or Shortness of Breath. 18 g 2       Review of Systems:  I reviewed the rest of organ systems personally and they are negative. Pertinent findings discussed in HPI.     Physical Examination    Visit Vitals  BP (!) 154/100 (BP 1 Location: Right upper arm, BP Patient Position: Sitting, BP Cuff Size: Large adult)   Pulse 90   Temp 98 °F (36.7 °C) (Temporal)   Resp 20   Wt 250 lb 8 oz (113.6 kg)   SpO2 98%   BMI 34.94 kg/m²     Gen: Well developed, well nourished 52 y.o. male in no acute distress  Head: normocephalic, atraumatic  Mouth: Clear, no overt lesions, oral mucosa pink and moist  Neck: supple, no masses, no adenopathy or carotid bruits, trachea midline  Resp: clear to auscultation bilaterally, no wheeze, rhonchi or rales, excursions normal and symmetrical  Cardio: Regular rate and rhythm, no murmurs, clicks, gallops or rubs, no edema or varicosities  Abdomen: soft, nontender, nondistended, normoactive bowel sounds, no hernias, no hepatosplenomegaly   Neuro: sensation and strength grossly intact and symmetrical  Psych: alert and oriented to person, place and time  Vascular examination: No carotid bruit  Skin: warm and moist.    Labs:        Images:      Candido Thomas MD    Assessments:  Patient Active Problem List   Diagnosis Code    Methamphetamine abuse, episodic (HCC) F15.10    Intermittent palpitations R00.2    SOB (shortness of breath) R06.02    Obesity (BMI 35.0-39.9 without comorbidity) E66.9    History of COVID-19 Z86.16    Elevated blood-pressure reading without diagnosis of hypertension R03.0    Dizziness R42    Morbid obesity ([address] E66.01    Need for hepatitis C screening test Z11.59       Plans: I am not sure if the patient has a carotid artery aneurysm. Patient will need to be imaging of the right carotid system. We will do 1 mm cut for CT angiogram of the neck to further evaluate this. Patient will come see me after the CT scan repeated.           Landry Yepez MD

## 2022-12-01 ENCOUNTER — HOSPITAL ENCOUNTER (OUTPATIENT)
Dept: CT IMAGING | Age: 47
Discharge: HOME | End: 2022-12-01
Attending: SURGERY
Payer: COMMERCIAL

## 2022-12-01 DIAGNOSIS — I72.0 CAROTID ANEURYSM, RIGHT (HCC): ICD-10-CM

## 2022-12-01 PROCEDURE — 70498 CT ANGIOGRAPHY NECK: CPT

## 2022-12-01 PROCEDURE — 74011000636 HC RX REV CODE- 636: Performed by: SURGERY

## 2022-12-01 RX ADMIN — IOPAMIDOL 100 ML: 755 INJECTION, SOLUTION INTRAVENOUS at 14:55

## 2022-12-09 ENCOUNTER — OFFICE VISIT (OUTPATIENT)
Dept: SURGERY | Age: 47
End: 2022-12-09
Payer: COMMERCIAL

## 2022-12-09 ENCOUNTER — TELEPHONE (OUTPATIENT)
Dept: SURGERY | Age: 47
End: 2022-12-09

## 2022-12-09 ENCOUNTER — HOSPITAL ENCOUNTER (OUTPATIENT)
Dept: CT IMAGING | Age: 47
End: 2022-12-09
Attending: SURGERY
Payer: COMMERCIAL

## 2022-12-09 VITALS
DIASTOLIC BLOOD PRESSURE: 93 MMHG | HEART RATE: 82 BPM | TEMPERATURE: 97.2 F | SYSTOLIC BLOOD PRESSURE: 134 MMHG | BODY MASS INDEX: 36.75 KG/M2 | OXYGEN SATURATION: 97 % | HEIGHT: 71 IN | WEIGHT: 262.5 LBS

## 2022-12-09 DIAGNOSIS — R06.02 SOB (SHORTNESS OF BREATH): ICD-10-CM

## 2022-12-09 DIAGNOSIS — I72.0 CAROTID ARTERY ANEURYSM (HCC): ICD-10-CM

## 2022-12-09 DIAGNOSIS — R06.02 SOB (SHORTNESS OF BREATH): Primary | ICD-10-CM

## 2022-12-09 PROCEDURE — 99213 OFFICE O/P EST LOW 20 MIN: CPT | Performed by: SURGERY

## 2022-12-09 PROCEDURE — 71260 CT THORAX DX C+: CPT

## 2022-12-09 PROCEDURE — 74011000636 HC RX REV CODE- 636: Performed by: SURGERY

## 2022-12-09 RX ADMIN — IOPAMIDOL 100 ML: 755 INJECTION, SOLUTION INTRAVENOUS at 15:50

## 2022-12-09 NOTE — PROGRESS NOTES
Identified pt with two pt identifiers (name and ). Reviewed chart in preparation for visit and have obtained necessary documentation. Juan Jose is a 52 y.o. male  Chief Complaint   Patient presents with    Follow-up     2w follow up after scan     Visit Vitals  BP (!) 134/93 (BP 1 Location: Left upper arm, BP Patient Position: Sitting, BP Cuff Size: Large adult)   Pulse 82   Temp 97.2 °F (36.2 °C) (Temporal)   Ht 5' 11\" (1.803 m)   Wt 262 lb 8 oz (119.1 kg)   SpO2 97%   BMI 36.61 kg/m²       1. Have you been to the ER, urgent care clinic since your last visit? Hospitalized since your last visit? No    2. Have you seen or consulted any other health care providers outside of the [address] [place] [address] since your last visit? Include any pap smears or colon screening. No    Patient and provider made aware of elevated BP x2. Patient asymptomatic. Patient reminded to monitor BP, continue to take BP medications if prescribed, and follow up with PCP/Cardiologist.  Patient expressed understanding and agreement.

## 2022-12-12 PROBLEM — I72.0 CAROTID ARTERY ANEURYSM (HCC): Status: ACTIVE | Noted: 2022-12-12

## 2022-12-12 NOTE — PROGRESS NOTES
VASCULAR FOLLOW UP      Subjective:   CHIEF COMPLAINTS:  Persistent shortness of breath. PRESENTATION OF ILLNESS:  Phyllis Gerard is a 52 y.o. very pleasant gentleman is follow-up today after CT angiogram of the neck to further evaluate right proximal ICA aneurysm. Patient complains still shortness of breath. Past Medical History:   Diagnosis Date    Arrhythmia     Asthma     Headache       Past Surgical History:   Procedure Laterality Date    HX APPENDECTOMY       Family History   Problem Relation Age of Onset    Cancer Mother     COPD Father     Heart Disease Father     Cancer Maternal Grandmother     Cancer Maternal Grandfather       Social History     Tobacco Use    Smoking status: Never    Smokeless tobacco: Never   Substance Use Topics    Alcohol use: Not Currently       Prior to Admission medications    Medication Sig Start Date End Date Taking? Authorizing Provider   montelukast (SINGULAIR) 10 mg tablet Take 10 mg by mouth nightly. 11/4/22  Yes Provider, Historical   fexofenadine (ALLEGRA) 180 mg tablet Take 1 Tablet by mouth daily. 7/1/22  Yes Jean Carlos Lomas MD   fluticasone propionate (FLONASE) 50 mcg/actuation nasal spray 2 Sprays by Both Nostrils route daily. 7/1/22  Yes Jean Carlos Lomas MD   albuterol (PROVENTIL HFA, VENTOLIN HFA, PROAIR HFA) 90 mcg/actuation inhaler Take 2 Puffs by inhalation every six (6) hours as needed for Wheezing or Shortness of Breath. 7/1/22  Yes Jean Carlos Lomas MD     No Known Allergies     Review of Systems:  I reviewed the rest of organ systems personally and they were negative signed by Dr. Loida Bey    Objective:   Visit Vitals  BP (!) 134/93 (BP 1 Location: Left upper arm, BP Patient Position: Sitting, BP Cuff Size: Large adult)   Pulse 82   Temp 97.2 °F (36.2 °C) (Temporal)   Ht 5' 11\" (1.803 m)   Wt 262 lb 8 oz (119.1 kg)   SpO2 97%   BMI 36.61 kg/m²     VITAL SIGNS REVIEWED. Physical Exam:  Patient is well-nourished pleasant in conversation is appropriate.   Head and neck examination atraumatic, normocephalic. Gaze appropriate. Conversation appropriate. Neck examination shows supple. No mass. No obvious carotid bruit. Chest examination shows lungs are clear bilaterally well-expanded, no crackles or wheezes. Cardiovascular system regular rate, no obvious murmur. Skin warm to touch  and moist, no skin lesions. Abdomen is soft ,not tender or distended bowel sounds present. No palpable mass. Neurological examinations, no focal neuro deficits moving all 4 extremities. Cranial nerves intact. Sensation is intact as well. Hematologic: No obvious bruise or swelling or obvious lymphadenopathy. Psychosocial: Appropriate. Has good effect. Musculoskeletal system: No muscle wasting, appropriate movements upper and lower extremity. Data Review:   No visits with results within 1 Month(s) from this visit. Latest known visit with results is:   Admission on 07/01/2022, Discharged on 07/01/2022   Component Date Value Ref Range Status    WBC 07/01/2022 5.9  4.1 - 11.1 K/uL Final    RBC 07/01/2022 4.63  4.10 - 5.70 M/uL Final    HGB 07/01/2022 13.6  12.1 - 17.0 g/dL Final    HCT 07/01/2022 40.3  36.6 - 50.3 % Final    MCV 07/01/2022 87.0  80.0 - 99.0 FL Final    MCH 07/01/2022 29.4  26.0 - 34.0 PG Final    MCHC 07/01/2022 33.7  30.0 - 36.5 g/dL Final    RDW 07/01/2022 13.5  11.5 - 14.5 % Final    PLATELET 30/02/4903 128  150 - 400 K/uL Final    MPV 07/01/2022 9.6  8.9 - 12.9 FL Final    NRBC 07/01/2022 0.0  0.0  WBC Final    ABSOLUTE NRBC 07/01/2022 0.00  0.00 - 0.01 K/uL Final    NEUTROPHILS 07/01/2022 49  32 - 75 % Final    LYMPHOCYTES 07/01/2022 41  12 - 49 % Final    MONOCYTES 07/01/2022 6  5 - 13 % Final    EOSINOPHILS 07/01/2022 3  0 - 7 % Final    BASOPHILS 07/01/2022 1  0 - 1 % Final    IMMATURE GRANULOCYTES 07/01/2022 0  0 - 0.5 % Final    ABS. NEUTROPHILS 07/01/2022 2.9  1.8 - 8.0 K/UL Final    ABS.  LYMPHOCYTES 07/01/2022 2.4  0.8 - 3.5 K/UL Final ABS. MONOCYTES 07/01/2022 0.4  0.0 - 1.0 K/UL Final    ABS. EOSINOPHILS 07/01/2022 0.2  0.0 - 0.4 K/UL Final    ABS. BASOPHILS 07/01/2022 0.1  0.0 - 0.1 K/UL Final    ABS. IMM. GRANS. 07/01/2022 0.0  0.00 - 0.04 K/UL Final    DF 07/01/2022 AUTOMATED    Final    Sodium 07/01/2022 137  136 - 145 mmol/L Final    Potassium 07/01/2022 4.2  3.5 - 5.1 mmol/L Final    Chloride 07/01/2022 105  97 - 108 mmol/L Final    CO2 07/01/2022 25  21 - 32 mmol/L Final    Anion gap 07/01/2022 7  5 - 15 mmol/L Final    Glucose 07/01/2022 97  65 - 100 mg/dL Final    BUN 07/01/2022 16  6 - 20 mg/dL Final    Creatinine 07/01/2022 1.06  0.70 - 1.30 mg/dL Final    BUN/Creatinine ratio 07/01/2022 15  12 - 20   Final    GFR est AA 07/01/2022 >60  >60 ml/min/1.73m2 Final    GFR est non-AA 07/01/2022 >60  >60 ml/min/1.73m2 Final    Calcium 07/01/2022 9.0  8.5 - 10.1 mg/dL Final    Bilirubin, total 07/01/2022 0.7  0.2 - 1.0 mg/dL Final    AST (SGOT) 07/01/2022 20  15 - 37 U/L Final    ALT (SGPT) 07/01/2022 25  12 - 78 U/L Final    Alk.  phosphatase 07/01/2022 52  45 - 117 U/L Final    Protein, total 07/01/2022 6.4  6.4 - 8.2 g/dL Final    Albumin 07/01/2022 3.4 (A)  3.5 - 5.0 g/dL Final    Globulin 07/01/2022 3.0  2.0 - 4.0 g/dL Final    A-G Ratio 07/01/2022 1.1  1.1 - 2.2   Final    Troponin-High Sensitivity 07/01/2022 5  0 - 76 ng/L Final    NT pro-BNP 07/01/2022 74  <125 pg/mL Final    Ventricular Rate 07/01/2022 74  BPM Final    Atrial Rate 07/01/2022 74  BPM Final    P-R Interval 07/01/2022 160  ms Final    QRS Duration 07/01/2022 88  ms Final    Q-T Interval 07/01/2022 378  ms Final    QTC Calculation (Bezet) 07/01/2022 419  ms Final    Calculated P Axis 07/01/2022 42  degrees Final    Calculated R Axis 07/01/2022 46  degrees Final    Calculated T Axis 07/01/2022 47  degrees Final    Diagnosis 07/01/2022    Final                    Value:Normal sinus rhythm  Normal ECG  When compared with ECG of 28-SEP-2021 15:46,  No significant change was found  Confirmed by KELSEY BRAVO, Leo Jenifer () on 7/1/2022 12:43:41 PM      Troponin-High Sensitivity 07/01/2022 6  0 - 76 ng/L Final        Assessment:     Problem List Items Addressed This Visit          Circulatory    Carotid artery aneurysm ([address]    Relevant Orders    CTA NECK       Other    SOB (shortness of breath) - Primary    Relevant Orders    CT CHEST W CONT (Completed)           Plan:     I did review CT angiogram neck with 0.6 mm cut, 3 mm right proximal ICA aneurysm noted. Recommend repeat CT angiogram neck in 6-month. Meanwhile we will obtain CTA of the chest to rule out pulmonary embolism due to persistent shortness of breath. I will make this study to be performed today.         Nelia Dao MD

## 2022-12-13 ENCOUNTER — HOSPITAL ENCOUNTER (EMERGENCY)
Age: 47
Discharge: HOME | End: 2022-12-14
Attending: STUDENT IN AN ORGANIZED HEALTH CARE EDUCATION/TRAINING PROGRAM
Payer: COMMERCIAL

## 2022-12-13 ENCOUNTER — APPOINTMENT (OUTPATIENT)
Dept: GENERAL RADIOLOGY | Age: 47
End: 2022-12-13
Attending: STUDENT IN AN ORGANIZED HEALTH CARE EDUCATION/TRAINING PROGRAM
Payer: COMMERCIAL

## 2022-12-13 VITALS
DIASTOLIC BLOOD PRESSURE: 86 MMHG | HEIGHT: 71 IN | OXYGEN SATURATION: 98 % | BODY MASS INDEX: 37.1 KG/M2 | TEMPERATURE: 97.8 F | SYSTOLIC BLOOD PRESSURE: 122 MMHG | RESPIRATION RATE: 16 BRPM | HEART RATE: 91 BPM | WEIGHT: 265 LBS

## 2022-12-13 DIAGNOSIS — R07.9 CHEST PAIN, UNSPECIFIED TYPE: Primary | ICD-10-CM

## 2022-12-13 LAB
ALBUMIN SERPL-MCNC: 4 G/DL (ref 3.5–5)
ALBUMIN/GLOB SERPL: 1.2 {RATIO} (ref 1.1–2.2)
ALP SERPL-CCNC: 64 U/L (ref 45–117)
ALT SERPL-CCNC: 27 U/L (ref 12–78)
ANION GAP SERPL CALC-SCNC: 4 MMOL/L (ref 5–15)
AST SERPL W P-5'-P-CCNC: 22 U/L (ref 15–37)
BASOPHILS # BLD: 0 K/UL (ref 0–0.1)
BASOPHILS NFR BLD: 1 % (ref 0–1)
BILIRUB SERPL-MCNC: 0.9 MG/DL (ref 0.2–1)
BUN SERPL-MCNC: 19 MG/DL (ref 6–20)
BUN/CREAT SERPL: 14 (ref 12–20)
CA-I BLD-MCNC: 9.6 MG/DL (ref 8.5–10.1)
CHLORIDE SERPL-SCNC: 103 MMOL/L (ref 97–108)
CO2 SERPL-SCNC: 31 MMOL/L (ref 21–32)
CREAT SERPL-MCNC: 1.38 MG/DL (ref 0.7–1.3)
DIFFERENTIAL METHOD BLD: ABNORMAL
EOSINOPHIL # BLD: 0.1 K/UL (ref 0–0.4)
EOSINOPHIL NFR BLD: 1 % (ref 0–7)
ERYTHROCYTE [DISTWIDTH] IN BLOOD BY AUTOMATED COUNT: 13.4 % (ref 11.5–14.5)
GLOBULIN SER CALC-MCNC: 3.3 G/DL (ref 2–4)
GLUCOSE SERPL-MCNC: 97 MG/DL (ref 65–100)
HCT VFR BLD AUTO: 47.3 % (ref 36.6–50.3)
HGB BLD-MCNC: 15.9 G/DL (ref 12.1–17)
IMM GRANULOCYTES # BLD AUTO: 0 K/UL (ref 0–0.04)
IMM GRANULOCYTES NFR BLD AUTO: 0 % (ref 0–0.5)
LYMPHOCYTES # BLD: 2.5 K/UL (ref 0.8–3.5)
LYMPHOCYTES NFR BLD: 29 % (ref 12–49)
MCH RBC QN AUTO: 29.3 PG (ref 26–34)
MCHC RBC AUTO-ENTMCNC: 33.6 G/DL (ref 30–36.5)
MCV RBC AUTO: 87.3 FL (ref 80–99)
MONOCYTES # BLD: 0.5 K/UL (ref 0–1)
MONOCYTES NFR BLD: 6 % (ref 5–13)
NEUTS SEG # BLD: 5.3 K/UL (ref 1.8–8)
NEUTS SEG NFR BLD: 63 % (ref 32–75)
NRBC # BLD: 0 K/UL (ref 0–0.01)
NRBC BLD-RTO: 0 PER 100 WBC
PLATELET # BLD AUTO: 319 K/UL (ref 150–400)
PMV BLD AUTO: 8.8 FL (ref 8.9–12.9)
POTASSIUM SERPL-SCNC: 4.2 MMOL/L (ref 3.5–5.1)
PROT SERPL-MCNC: 7.3 G/DL (ref 6.4–8.2)
RBC # BLD AUTO: 5.42 M/UL (ref 4.1–5.7)
SODIUM SERPL-SCNC: 138 MMOL/L (ref 136–145)
TROPONIN-HIGH SENSITIVITY: 5 NG/L (ref 0–76)
TROPONIN-HIGH SENSITIVITY: 5 NG/L (ref 0–76)
WBC # BLD AUTO: 8.4 K/UL (ref 4.1–11.1)

## 2022-12-13 PROCEDURE — 84484 ASSAY OF TROPONIN QUANT: CPT

## 2022-12-13 PROCEDURE — 36415 COLL VENOUS BLD VENIPUNCTURE: CPT

## 2022-12-13 PROCEDURE — 71045 X-RAY EXAM CHEST 1 VIEW: CPT

## 2022-12-13 PROCEDURE — 93005 ELECTROCARDIOGRAM TRACING: CPT

## 2022-12-13 PROCEDURE — 99285 EMERGENCY DEPT VISIT HI MDM: CPT

## 2022-12-13 PROCEDURE — 85025 COMPLETE CBC W/AUTO DIFF WBC: CPT

## 2022-12-13 PROCEDURE — 80053 COMPREHEN METABOLIC PANEL: CPT

## 2022-12-13 NOTE — Clinical Note
[facility] EMERGENCY DEPT  [address] [place] [id number] [phone]    Work/School Note    Date: 12/13/2022    To Whom It May concern:      Abel Gore was seen and treated today in the emergency room by the following provider(s):  Attending Provider: Brandin Friedman MD.      Abel Gore is excused from work/school on 12/13/22. He is clear to return to work/school on 12/14/22.         Sincerely,          Adalberto De La Cruz MD

## 2022-12-14 ENCOUNTER — TELEPHONE (OUTPATIENT)
Dept: SURGERY | Age: 47
End: 2022-12-14

## 2022-12-14 LAB
ATRIAL RATE: 84 BPM
CALCULATED P AXIS, ECG09: 52 DEGREES
CALCULATED R AXIS, ECG10: 36 DEGREES
CALCULATED T AXIS, ECG11: 38 DEGREES
DIAGNOSIS, 93000: NORMAL
P-R INTERVAL, ECG05: 158 MS
Q-T INTERVAL, ECG07: 362 MS
QRS DURATION, ECG06: 84 MS
QTC CALCULATION (BEZET), ECG08: 427 MS
VENTRICULAR RATE, ECG03: 84 BPM

## 2022-12-14 NOTE — TELEPHONE ENCOUNTER
Hao Torsten called this morning and wanted to know where Dr Glenis Martinez recommended him to, he stated he thought it was to a pulm doc, but couldn't remember.  Please call when able [phone]

## 2022-12-14 NOTE — ED NOTES
10:55 PM    Second troponin with negative delta change. ACS ruled out per the high-sensitivity troponin algorithm. The patient presented with chest pain of uncertain etiology. Based on the patient's historical features and ED evaluation, in addition to the patient's reassuring physical exam, I do not see compelling evidence at this time for a malignant etiology for the patient's chest pain. The patient does not have clinical evidence for pulmonary embolus, malignant cardiac, pulmonary or aortic pathology or the other more malignant etiologies for chest pain. Based on the abovementioned evaluation, a malignant process is unlikely. Understanding was insured that at this time there is no evidence for a more malignant underlying process, but that early in the process of an illness, an emergency department workup can be falsely reassuring. Routine discharge counseling was given including the fact that any worsening, changing or persistent symptoms should prompt an immediate call or follow up with their primary physician or the emergency department. The importance of appropriate follow up was also discussed. More extensive discharge instructions were given in the patient's discharge paperwork. After completion of evaluation and discussion of results and diagnoses, all the questions were answered. If required, all follow up appointments and treatments were discussed and explained. Understanding was insured prior to discharge. HEART SCORE    History: Slightly or Non-suspicious  ECG: Normal  Age: Greater than 45 years - Less than 65 years  Risk Factors: No risk factors known  Troponin: Less than or equal to normal limit   HEART Score Total : 1     Management   Scores 0-3: 0.9-1.7% risk of adverse cardiac event. In the HEART Score study, these patients were discharged (0.99% in the retrospective study, 1.7% in the prospective study)   Scores 4-6: 12-16.6% risk of adverse cardiac event.  In the HEART Score study, these patients were admitted to the hospital. (11.6% retrospective, 16.6% prospective)   Scores ? 7: 50-65% risk of adverse cardiac event. In the HEART Score study, these patients were candidates for early invasive measures. (65.2% retrospective, 50.1% prospective)   A MACE (Major Adverse Cardiac Event) was defined as all-cause mortality, myocardial infarction, or coronary revascularization. Original/Primary Reference  Ajay Blas, Matilda WARE. Chest pain in the emergency room: value of the HEART score. Neth Heart J. 2008;16(6):191-6. Validation  Alexander Alcantar, Matilda WARE, et al. A prospective validation of the HEART score for chest pain patients at the emergency department. Int J Cardiol. 2013;168(3):2153-8. Alexander Alcantar, Elif Saavedra, et al. Chest pain in the emergency room: a multicenter validation of the HEART Score. Crit Pathw Cardiol. 2010;9(3):164-9. Danielito AMY, Craig RF, Amanda NEWMAN, et al. The HEART Pathway Randomized Controlled Trial One Year Outcomes.  [facility] 2018;

## 2022-12-14 NOTE — ED TRIAGE NOTES
Had 3 episodes of chest pain today.  Was recently diagnosed with 2 anurysm   Pt reports he \"felt like something moved\" in his chest

## 2022-12-14 NOTE — ED PROVIDER NOTES
EMERGENCY DEPARTMENT HISTORY AND PHYSICAL EXAM      Date: 12/13/2022  Patient Name: Essie Crum    History of Presenting Illness     Chief Complaint   Patient presents with    Chest Pain       History Provided By: Patient    HPI: Essie Crum, 52 y.o. male with past medical history as reviewed below presents for evaluation of chest pain. Patient endorses 2 discrete episodes of sharp chest pain that occurred earlier today while he was bending over. He felt like \"something moved \". Pain lasted approximately 30 seconds and resolved without intervention. He is chest pain-free now. No associated dyspnea. No weakness, no upper extremity paresthesias. He reports recently being diagnosed with an aortic aneurysm and pulmonary hypertension. There are no other complaints, changes, or physical findings at this time. Past History     Past Medical History:  Past Medical History:   Diagnosis Date    Arrhythmia     Asthma     Headache        Past Surgical History:  Past Surgical History:   Procedure Laterality Date    HX APPENDECTOMY         Family History:  Family History   Problem Relation Age of Onset    Cancer Mother     COPD Father     Heart Disease Father     Cancer Maternal Grandmother     Cancer Maternal Grandfather        Social History:  Social History     Tobacco Use    Smoking status: Never    Smokeless tobacco: Never   Vaping Use    Vaping Use: Never used   Substance Use Topics    Alcohol use: Not Currently    Drug use: Not Currently     Types: Methamphetamines     Comment: 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 Current Outpatient Medications on File Prior to Encounter   Medication Sig Dispense Refill    montelukast (SINGULAIR) 10 mg tablet Take 10 mg by mouth nightly.       fexofenadine (ALLEGRA) 180 mg tablet Take 1 Tablet by mouth daily. 30 Tablet 2    fluticasone propionate (FLONASE) 50 mcg/actuation nasal spray 2 Sprays by Both Nostrils route daily. 1 Each 2    albuterol (PROVENTIL HFA, VENTOLIN HFA, PROAIR HFA) 90 mcg/actuation inhaler Take 2 Puffs by inhalation every six (6) hours as needed for Wheezing or Shortness of Breath. 18 g 2       Review of Systems   Review of Systems   Constitutional:  Negative for fever. HENT:  Negative for congestion. Respiratory:  Negative for cough and shortness of breath. Cardiovascular:  Positive for chest pain. Gastrointestinal:  Negative for abdominal pain, constipation, nausea and vomiting. Genitourinary:  Negative for dysuria. Musculoskeletal:  Negative for arthralgias and myalgias. Skin:  Negative for rash. Allergic/Immunologic: Negative for immunocompromised state. Neurological:  Negative for syncope. Psychiatric/Behavioral:  Negative for confusion. Physical Exam   Physical Exam  Vitals reviewed. Constitutional:       General: He is not in acute distress. Appearance: He is not toxic-appearing. HENT:      Head: Normocephalic and atraumatic. Eyes:      Extraocular Movements: Extraocular movements intact. Pupils: Pupils are equal, round, and reactive to light. Cardiovascular:      Rate and Rhythm: Normal rate and regular rhythm. Heart sounds: Normal heart sounds. Pulmonary:      Effort: Pulmonary effort is normal.      Breath sounds: Normal breath sounds. Abdominal:      Palpations: Abdomen is soft. Tenderness: There is no abdominal tenderness. Musculoskeletal:      Right lower leg: No edema. Left lower leg: No edema. Skin:     General: Skin is warm and dry. Neurological:      General: No focal deficit present. Mental Status: He is alert.    Psychiatric:         Mood and Affect: Mood normal.         Behavior: Behavior normal.        Lab and Diagnostic Study Results   Labs -     Recent Results (from the past 12 hour(s))   CBC WITH AUTOMATED DIFF    Collection Time: 12/13/22  7:46 PM   Result Value Ref Range    WBC 8.4 4.1 - 11.1 K/uL    RBC 5.42 4.10 - 5.70 M/uL    HGB 15.9 12.1 - 17.0 g/dL    HCT 47.3 36.6 - 50.3 %    MCV 87.3 80.0 - 99.0 FL    MCH 29.3 26.0 - 34.0 PG    MCHC 33.6 30.0 - 36.5 g/dL    RDW 13.4 11.5 - 14.5 %    PLATELET 377 966 - 989 K/uL    MPV 8.8 (L) 8.9 - 12.9 FL    NRBC 0.0 0.0  WBC    ABSOLUTE NRBC 0.00 0.00 - 0.01 K/uL    NEUTROPHILS 63 32 - 75 %    LYMPHOCYTES 29 12 - 49 %    MONOCYTES 6 5 - 13 %    EOSINOPHILS 1 0 - 7 %    BASOPHILS 1 0 - 1 %    IMMATURE GRANULOCYTES 0 0 - 0.5 %    ABS. NEUTROPHILS 5.3 1.8 - 8.0 K/UL    ABS. LYMPHOCYTES 2.5 0.8 - 3.5 K/UL    ABS. MONOCYTES 0.5 0.0 - 1.0 K/UL    ABS. EOSINOPHILS 0.1 0.0 - 0.4 K/UL    ABS. BASOPHILS 0.0 0.0 - 0.1 K/UL    ABS. IMM. GRANS. 0.0 0.00 - 0.04 K/UL    DF AUTOMATED         Radiologic Studies -   @lastxrresult@  CT Results  (Last 48 hours)      None          CXR Results  (Last 48 hours)                 12/13/22 2001  XR CHEST PORT Final result    Impression:      No acute process on portable chest.           Narrative:  EXAM:  XR CHEST PORT       INDICATION: Chest pain       COMPARISON: July 1, 2022       TECHNIQUE: portable chest AP view       FINDINGS: The cardiac silhouette is within normal limits. The pulmonary   vasculature is within normal limits. The lungs and pleural spaces are clear. The visualized bones and upper abdomen   are age-appropriate. Medical Decision Making and ED Course   Differential Diagnosis & Medical Decision Making Provider Note:   27-year-old male presents for evaluation of 2 discrete episodes of chest pain. Pain is positional, not exertional.  History suggests musculoskeletal chest pain.   Patient very concerned chest pain in the setting of his recently diagnosed aortic aneurysm, but do not suspect the pain as result of aneurysmal dissection or rupture given lack of persistent pain, normal blood pressure, no upper extremity symptoms, no syncope or collapse. Will rule out ACS with troponin and EKG. Chest x-ray to evaluate for widened mediastinum or pneumonia. Recent diagnosis of pulmonary hypertension due to discussion about his exertional fatigue and easy fatigability, recommending follow-up with pulmonology. - I am the first provider for this patient. I reviewed the vital signs, available nursing notes, past medical history, past surgical history, family history and social history. The patients presenting problems have been discussed, and they are in agreement with the care plan formulated and outlined with them. I have encouraged them to ask questions as they arise throughout their visit. Vital Signs-Reviewed the patient's vital signs. Patient Vitals for the past 12 hrs:   Temp Pulse Resp BP SpO2   12/13/22 1936 98.3 °F (36.8 °C) 82 18 137/82 98 %       ED Course:   ED Course as of 12/21/22 1729 Tue Dec 13, 2022   1945 ECG performed at 1939 and interpreted by me shows normal sinus rhythm with a ventricular rate of 84, normal intervals, no ST elevation or depression [BQ]   2027 XR CHEST PORT  FINDINGS: The cardiac silhouette is within normal limits. The pulmonary  vasculature is within normal limits. The lungs and pleural spaces are clear. The visualized bones and upper abdomen  are age-appropriate. IMPRESSION     No acute process on portable chest. [BQ]   2113 Composition of the HEART score for chest pain, angina, NSTEMI in the ED. HEART score criteria             Score  History: Moderately suspicious   1        ECG:      Normal      0    Age:  > 45 to < 65 years    1        Risk factors:     No risk factors known    0    Troponin:     < or = to normal limit    0    Calculated Total : ___2___        0-3: 0.9-1.7% risk of adverse cardiac event. In the HEART Score, these patients were  discharged. 4-6: 12-16.6% risk of adverse cardiac event.  In the HEART Score, these patients were  admitted to the hospital.   =7: 50-65% risk of adverse cardiac event. In the HEART Score, these patients were  candidates for early invasive measures. [BQ]      ED Course User Index  [BQ] Brandin Friedman MD         Procedures   Performed by: Adalberto De La Cruz MD  Procedures      Disposition   Disposition: DC- Adult Discharges: All of the diagnostic tests were reviewed and questions answered. Diagnosis, care plan and treatment options were discussed. The patient understands the instructions and will follow up as directed. The patients results have been reviewed with them. They have been counseled regarding their diagnosis. The patient verbally convey understanding and agreement of the signs, symptoms, diagnosis, treatment and prognosis and additionally agrees to follow up as recommended with their PCP in 24 - 48 hours. They also agree with the care-plan and convey that all of their questions have been answered. I have also put together some discharge instructions for them that include: 1) educational information regarding their diagnosis, 2) how to care for their diagnosis at home, as well a 3) list of reasons why they would want to return to the ED prior to their follow-up appointment, should their condition change. DISCHARGE PLAN:  1. Current Discharge Medication List        CONTINUE these medications which have NOT CHANGED    Details   montelukast (SINGULAIR) 10 mg tablet Take 10 mg by mouth nightly. fexofenadine (ALLEGRA) 180 mg tablet Take 1 Tablet by mouth daily. Qty: 30 Tablet, Refills: 2      fluticasone propionate (FLONASE) 50 mcg/actuation nasal spray 2 Sprays by Both Nostrils route daily. Qty: 1 Each, Refills: 2      albuterol (PROVENTIL HFA, VENTOLIN HFA, PROAIR HFA) 90 mcg/actuation inhaler Take 2 Puffs by inhalation every six (6) hours as needed for Wheezing or Shortness of Breath. Qty: 18 g, Refills: 2           2.    Follow-up Information       Follow up With Specialties Details Why Contact Info    Suki Foster,  Pulmonary Critical Care Schedule an appointment as soon as possible for a visit  Follow-up with this pulmonologist for your pulmonary hypertension [address]      Abel Servinh, [address]   [address] [facility] [address]  [phone]            3. Return to ED if worse   4. Current Discharge Medication List            Diagnosis/Clinical Impression     Clinical Impression:   1. Chest pain, unspecified type        Attestations: Alina Aguilera MD, am the primary clinician of record. Please note that this dictation was completed with [employer], the computer voice recognition software. Quite often unanticipated grammatical, syntax, homophones, and other interpretive errors are inadvertently transcribed by the computer software. Please disregard these errors. Please excuse any errors that have escaped final proofreading. Thank you.

## 2022-12-14 NOTE — TELEPHONE ENCOUNTER
Returned pt call. According to ER note, pt is to see Dr. Demetri Porras. I gave pt phone number to call for appt. Pt also said he will call [facility] for an appt there as well.

## 2022-12-15 DIAGNOSIS — I72.0 CAROTID ARTERY ANEURYSM (HCC): Primary | ICD-10-CM

## 2022-12-20 ENCOUNTER — TELEPHONE (OUTPATIENT)
Dept: SURGERY | Age: 47
End: 2022-12-20

## 2022-12-27 ENCOUNTER — TELEPHONE (OUTPATIENT)
Dept: SURGERY | Age: 47
End: 2022-12-27

## 2022-12-27 ENCOUNTER — DOCUMENTATION ONLY (OUTPATIENT)
Dept: SURGERY | Age: 47
End: 2022-12-27

## 2022-12-27 NOTE — TELEPHONE ENCOUNTER
Patient called today regarding two issues. The first is Dr. Cassidy Dash was going to refer him to a cardiologist and pulmonary doctor as well. Who does he need to see? He is also asking if his [facility] paperwork is complete.  Call him at [phone]

## 2022-12-27 NOTE — TELEPHONE ENCOUNTER
Gave patient the numbers for Jarrell Pin and [address] [facility] cardiology patient stated he will call them also let him know dr navarrete has the paperwork and will fill it out we will let him know when it is ready.  Patient stated his understanding

## 2023-01-03 ENCOUNTER — TELEPHONE (OUTPATIENT)
Dept: SURGERY | Age: 48
End: 2023-01-03

## 2023-01-03 NOTE — TELEPHONE ENCOUNTER
Spoke to Skye and let her know the only thing I see in visits is from ER 7/2022 and that was for cardiology.  She is going to call patient and see how he got referred to her

## 2023-01-03 NOTE — TELEPHONE ENCOUNTER
Peggy Carroll from [facility] called this morning and wanted to clarify that the patient is supposed to be at Cardiac tomorrow, she stated there are no notes on the patient and she was looking and she sees hes supposed to see vascular.  Please call [phone]

## 2023-01-18 ENCOUNTER — HOSPITAL ENCOUNTER (OUTPATIENT)
Dept: PREADMISSION TESTING | Age: 48
Discharge: HOME | End: 2023-01-18
Payer: COMMERCIAL

## 2023-01-18 VITALS
WEIGHT: 277.4 LBS | TEMPERATURE: 98.4 F | BODY MASS INDEX: 38.84 KG/M2 | HEIGHT: 71 IN | HEART RATE: 70 BPM | RESPIRATION RATE: 18 BRPM | DIASTOLIC BLOOD PRESSURE: 70 MMHG | SYSTOLIC BLOOD PRESSURE: 94 MMHG

## 2023-01-18 LAB
ALBUMIN SERPL-MCNC: 3.7 G/DL (ref 3.5–5)
ALBUMIN/GLOB SERPL: 1.1 (ref 1.1–2.2)
ALP SERPL-CCNC: 60 U/L (ref 45–117)
ALT SERPL-CCNC: 26 U/L (ref 12–78)
ANION GAP SERPL CALC-SCNC: 6 MMOL/L (ref 5–15)
APTT PPP: 31.5 SEC (ref 21.2–34.1)
AST SERPL W P-5'-P-CCNC: 17 U/L (ref 15–37)
ATRIAL RATE: 68 BPM
BILIRUB SERPL-MCNC: 0.9 MG/DL (ref 0.2–1)
BUN SERPL-MCNC: 23 MG/DL (ref 6–20)
BUN/CREAT SERPL: 23 (ref 12–20)
CA-I BLD-MCNC: 9 MG/DL (ref 8.5–10.1)
CALCULATED P AXIS, ECG09: 23 DEGREES
CALCULATED R AXIS, ECG10: 2 DEGREES
CALCULATED T AXIS, ECG11: 14 DEGREES
CHLORIDE SERPL-SCNC: 109 MMOL/L (ref 97–108)
CO2 SERPL-SCNC: 25 MMOL/L (ref 21–32)
CREAT SERPL-MCNC: 1.02 MG/DL (ref 0.7–1.3)
DIAGNOSIS, 93000: NORMAL
ERYTHROCYTE [DISTWIDTH] IN BLOOD BY AUTOMATED COUNT: 13.8 % (ref 11.5–14.5)
GLOBULIN SER CALC-MCNC: 3.5 G/DL (ref 2–4)
GLUCOSE SERPL-MCNC: 97 MG/DL (ref 65–100)
HCT VFR BLD AUTO: 44.3 % (ref 36.6–50.3)
HGB BLD-MCNC: 15.2 G/DL (ref 12.1–17)
INR PPP: 1 (ref 0.9–1.1)
MCH RBC QN AUTO: 29.5 PG (ref 26–34)
MCHC RBC AUTO-ENTMCNC: 34.3 G/DL (ref 30–36.5)
MCV RBC AUTO: 85.9 FL (ref 80–99)
NRBC # BLD: 0 K/UL (ref 0–0.01)
NRBC BLD-RTO: 0 PER 100 WBC
P-R INTERVAL, ECG05: 168 MS
PLATELET # BLD AUTO: 251 K/UL (ref 150–400)
PMV BLD AUTO: 9.4 FL (ref 8.9–12.9)
POTASSIUM SERPL-SCNC: 4.4 MMOL/L (ref 3.5–5.1)
PROT SERPL-MCNC: 7.2 G/DL (ref 6.4–8.2)
PROTHROMBIN TIME: 13.2 SEC (ref 11.9–14.6)
Q-T INTERVAL, ECG07: 382 MS
QRS DURATION, ECG06: 90 MS
QTC CALCULATION (BEZET), ECG08: 406 MS
RBC # BLD AUTO: 5.16 M/UL (ref 4.1–5.7)
SODIUM SERPL-SCNC: 140 MMOL/L (ref 136–145)
THERAPEUTIC RANGE,PTTT: NORMAL SEC (ref 82–109)
VENTRICULAR RATE, ECG03: 68 BPM
WBC # BLD AUTO: 5.3 K/UL (ref 4.1–11.1)

## 2023-01-18 PROCEDURE — 36415 COLL VENOUS BLD VENIPUNCTURE: CPT

## 2023-01-18 PROCEDURE — 93005 ELECTROCARDIOGRAM TRACING: CPT

## 2023-01-18 PROCEDURE — 85730 THROMBOPLASTIN TIME PARTIAL: CPT

## 2023-01-18 PROCEDURE — 80053 COMPREHEN METABOLIC PANEL: CPT

## 2023-01-18 PROCEDURE — 85027 COMPLETE CBC AUTOMATED: CPT

## 2023-01-18 PROCEDURE — 85610 PROTHROMBIN TIME: CPT

## 2023-01-18 RX ORDER — METOPROLOL TARTRATE 25 MG/1
25 TABLET, FILM COATED ORAL 2 TIMES DAILY
COMMUNITY

## 2023-01-18 RX ORDER — FLUTICASONE FUROATE, UMECLIDINIUM BROMIDE AND VILANTEROL TRIFENATATE 100; 62.5; 25 UG/1; UG/1; UG/1
1 POWDER RESPIRATORY (INHALATION) DAILY
COMMUNITY

## 2023-01-18 NOTE — PROGRESS NOTES
Dr. Jaimee Barba made aware of BP 94/70 during PAT visit. Per Dr. Jaimee Barba, patient to start taking 0.5 tablet twice daily. Patient made aware.

## 2023-01-18 NOTE — PROGRESS NOTES
Patient states he has a 3.3 mm aneurysm on his left jaw/neck region and a 4.4 mm aneurysm in his chest/abdomen. Patient states MD already is aware. The carotid aneurysm is noted in his 12/19/22 office visit. Patient slightly concerned about his BP 94/70 today. Currently taking metoprolol 25 mg BID.

## 2023-01-20 ENCOUNTER — HOSPITAL ENCOUNTER (OUTPATIENT)
Dept: CT IMAGING | Age: 48
End: 2023-01-20
Attending: STUDENT IN AN ORGANIZED HEALTH CARE EDUCATION/TRAINING PROGRAM | Admitting: STUDENT IN AN ORGANIZED HEALTH CARE EDUCATION/TRAINING PROGRAM
Payer: COMMERCIAL

## 2023-01-20 ENCOUNTER — HOSPITAL ENCOUNTER (OUTPATIENT)
Age: 48
Discharge: HOME | End: 2023-01-20
Attending: STUDENT IN AN ORGANIZED HEALTH CARE EDUCATION/TRAINING PROGRAM | Admitting: STUDENT IN AN ORGANIZED HEALTH CARE EDUCATION/TRAINING PROGRAM
Payer: COMMERCIAL

## 2023-01-20 VITALS
OXYGEN SATURATION: 100 % | SYSTOLIC BLOOD PRESSURE: 118 MMHG | HEART RATE: 75 BPM | RESPIRATION RATE: 18 BRPM | TEMPERATURE: 98 F | DIASTOLIC BLOOD PRESSURE: 85 MMHG

## 2023-01-20 DIAGNOSIS — R07.89 OTHER CHEST PAIN: ICD-10-CM

## 2023-01-20 PROBLEM — R07.9 CHEST PAIN: Status: ACTIVE | Noted: 2023-01-20

## 2023-01-20 LAB
AMPHET UR QL SCN: NEGATIVE
BARBITURATES UR QL SCN: NEGATIVE
BENZODIAZ UR QL: NEGATIVE
CANNABINOIDS UR QL SCN: NEGATIVE
COCAINE UR QL SCN: NEGATIVE
DRUG SCRN COMMENT,DRGCM: NORMAL
METHADONE UR QL: NEGATIVE
OPIATES UR QL: NEGATIVE
PCP UR QL: NEGATIVE

## 2023-01-20 PROCEDURE — 74011250637 HC RX REV CODE- 250/637: Performed by: STUDENT IN AN ORGANIZED HEALTH CARE EDUCATION/TRAINING PROGRAM

## 2023-01-20 PROCEDURE — 2709999900 HC NON-CHARGEABLE SUPPLY: Performed by: STUDENT IN AN ORGANIZED HEALTH CARE EDUCATION/TRAINING PROGRAM

## 2023-01-20 PROCEDURE — 70450 CT HEAD/BRAIN W/O DYE: CPT

## 2023-01-20 PROCEDURE — 93460 R&L HRT ART/VENTRICLE ANGIO: CPT | Performed by: STUDENT IN AN ORGANIZED HEALTH CARE EDUCATION/TRAINING PROGRAM

## 2023-01-20 PROCEDURE — C1769 GUIDE WIRE: HCPCS | Performed by: STUDENT IN AN ORGANIZED HEALTH CARE EDUCATION/TRAINING PROGRAM

## 2023-01-20 PROCEDURE — C1894 INTRO/SHEATH, NON-LASER: HCPCS | Performed by: STUDENT IN AN ORGANIZED HEALTH CARE EDUCATION/TRAINING PROGRAM

## 2023-01-20 PROCEDURE — 74011250636 HC RX REV CODE- 250/636: Performed by: STUDENT IN AN ORGANIZED HEALTH CARE EDUCATION/TRAINING PROGRAM

## 2023-01-20 PROCEDURE — 74011000636 HC RX REV CODE- 636: Performed by: STUDENT IN AN ORGANIZED HEALTH CARE EDUCATION/TRAINING PROGRAM

## 2023-01-20 PROCEDURE — 80307 DRUG TEST PRSMV CHEM ANLYZR: CPT

## 2023-01-20 PROCEDURE — 77030041700 HC CATH BLLN WDG PRES TELE -B: Performed by: STUDENT IN AN ORGANIZED HEALTH CARE EDUCATION/TRAINING PROGRAM

## 2023-01-20 PROCEDURE — 77030040934 HC CATH DIAG DXTERITY MEDT -A: Performed by: STUDENT IN AN ORGANIZED HEALTH CARE EDUCATION/TRAINING PROGRAM

## 2023-01-20 PROCEDURE — 74011000250 HC RX REV CODE- 250: Performed by: STUDENT IN AN ORGANIZED HEALTH CARE EDUCATION/TRAINING PROGRAM

## 2023-01-20 PROCEDURE — 77030029997 HC DEV COM RDL R BND TELE -B: Performed by: STUDENT IN AN ORGANIZED HEALTH CARE EDUCATION/TRAINING PROGRAM

## 2023-01-20 PROCEDURE — 76210000000 HC OR PH I REC 2 TO 2.5 HR: Performed by: STUDENT IN AN ORGANIZED HEALTH CARE EDUCATION/TRAINING PROGRAM

## 2023-01-20 PROCEDURE — 76210000025 HC REC RM PH II 3 TO 3.5 HR: Performed by: STUDENT IN AN ORGANIZED HEALTH CARE EDUCATION/TRAINING PROGRAM

## 2023-01-20 PROCEDURE — 99152 MOD SED SAME PHYS/QHP 5/>YRS: CPT | Performed by: STUDENT IN AN ORGANIZED HEALTH CARE EDUCATION/TRAINING PROGRAM

## 2023-01-20 PROCEDURE — 99153 MOD SED SAME PHYS/QHP EA: CPT | Performed by: STUDENT IN AN ORGANIZED HEALTH CARE EDUCATION/TRAINING PROGRAM

## 2023-01-20 PROCEDURE — 77030029065 HC DRSG HEMO QCLOT ZMED -B: Performed by: STUDENT IN AN ORGANIZED HEALTH CARE EDUCATION/TRAINING PROGRAM

## 2023-01-20 PROCEDURE — 77030019698 HC SYR ANGI MDLON MRTM -A: Performed by: STUDENT IN AN ORGANIZED HEALTH CARE EDUCATION/TRAINING PROGRAM

## 2023-01-20 PROCEDURE — 76937 US GUIDE VASCULAR ACCESS: CPT | Performed by: STUDENT IN AN ORGANIZED HEALTH CARE EDUCATION/TRAINING PROGRAM

## 2023-01-20 RX ORDER — VERAPAMIL HYDROCHLORIDE 2.5 MG/ML
INJECTION, SOLUTION INTRAVENOUS AS NEEDED
Status: DISCONTINUED | OUTPATIENT
Start: 2023-01-20 | End: 2023-01-20 | Stop reason: HOSPADM

## 2023-01-20 RX ORDER — SODIUM CHLORIDE 0.9 % (FLUSH) 0.9 %
5-40 SYRINGE (ML) INJECTION EVERY 8 HOURS
Status: DISCONTINUED | OUTPATIENT
Start: 2023-01-20 | End: 2023-01-20 | Stop reason: HOSPADM

## 2023-01-20 RX ORDER — ACETAMINOPHEN 325 MG/1
650 TABLET ORAL ONCE
Status: COMPLETED | OUTPATIENT
Start: 2023-01-20 | End: 2023-01-20

## 2023-01-20 RX ORDER — BUTALBITAL, ACETAMINOPHEN AND CAFFEINE 50; 325; 40 MG/1; MG/1; MG/1
1 TABLET ORAL ONCE
Status: COMPLETED | OUTPATIENT
Start: 2023-01-20 | End: 2023-01-20

## 2023-01-20 RX ORDER — LIDOCAINE HYDROCHLORIDE 10 MG/ML
INJECTION INFILTRATION; PERINEURAL AS NEEDED
Status: DISCONTINUED | OUTPATIENT
Start: 2023-01-20 | End: 2023-01-20 | Stop reason: HOSPADM

## 2023-01-20 RX ORDER — FENTANYL CITRATE 50 UG/ML
INJECTION, SOLUTION INTRAMUSCULAR; INTRAVENOUS AS NEEDED
Status: DISCONTINUED | OUTPATIENT
Start: 2023-01-20 | End: 2023-01-20 | Stop reason: HOSPADM

## 2023-01-20 RX ORDER — SODIUM CHLORIDE 0.9 % (FLUSH) 0.9 %
5-40 SYRINGE (ML) INJECTION AS NEEDED
Status: DISCONTINUED | OUTPATIENT
Start: 2023-01-20 | End: 2023-01-20 | Stop reason: HOSPADM

## 2023-01-20 RX ORDER — SODIUM CHLORIDE 9 MG/ML
50 INJECTION, SOLUTION INTRAVENOUS CONTINUOUS
Status: DISCONTINUED | OUTPATIENT
Start: 2023-01-20 | End: 2023-01-20 | Stop reason: HOSPADM

## 2023-01-20 RX ORDER — NITROGLYCERIN 5 MG/ML
INJECTION, SOLUTION INTRAVENOUS AS NEEDED
Status: DISCONTINUED | OUTPATIENT
Start: 2023-01-20 | End: 2023-01-20 | Stop reason: HOSPADM

## 2023-01-20 RX ORDER — HEPARIN SODIUM 1000 [USP'U]/ML
INJECTION, SOLUTION INTRAVENOUS; SUBCUTANEOUS AS NEEDED
Status: DISCONTINUED | OUTPATIENT
Start: 2023-01-20 | End: 2023-01-20 | Stop reason: HOSPADM

## 2023-01-20 RX ORDER — MIDAZOLAM HYDROCHLORIDE 1 MG/ML
INJECTION INTRAMUSCULAR; INTRAVENOUS AS NEEDED
Status: DISCONTINUED | OUTPATIENT
Start: 2023-01-20 | End: 2023-01-20 | Stop reason: HOSPADM

## 2023-01-20 RX ORDER — HEPARIN SODIUM 200 [USP'U]/100ML
INJECTION, SOLUTION INTRAVENOUS
Status: COMPLETED | OUTPATIENT
Start: 2023-01-20 | End: 2023-01-20

## 2023-01-20 RX ADMIN — ACETAMINOPHEN 650 MG: 325 TABLET, FILM COATED ORAL at 12:38

## 2023-01-20 RX ADMIN — BUTALBITAL, ACETAMINOPHEN, AND CAFFEINE 1 TABLET: 50; 325; 40 TABLET ORAL at 14:45

## 2023-01-20 NOTE — PERIOP NOTES
Patient with left eye \"bluriness\". No neuro deficit. Eating lunch. Dr. Narciso Medina notified and will see patient.

## 2023-01-20 NOTE — DISCHARGE INSTRUCTIONS
[facility]  Cardiac Catheterization Department  [address] [facility], [address] [facility]  [phone]        Coronary Angiogram: What to Expect at [address] [facility]  A coronary angiogram is a test to examine the large blood vessels of your heart (coronary arteries). The doctor inserted a thin, flexible tube (catheter into a blood vessel in your groin or wrist.  Your groin or wrist may have a bruise and feel sore for a few days after the procedure. You can do light activities around the house. But do not do anything strenuous until your doctor says it is ok. This may be for several days. This care sheet gives you a general idea about how long it will take for you to recover. But each person recovers at a different pace. Follow the steps below to feel better as quickly as possible. How can you care for yourself at home? Activity  If the doctor gave you a sedative: For 24 hours, don't do anything that requires attention to detail, such as going to work, making important decisions, or signing any legal documents. It takes time for the medicine's effects to completely wear off. For your safety, do not drive or operate any machinery that could be dangerous. Wait until the medicine wears off and you can think clearly and react easily. Do not do any strenuous exercise and do not lift, pull, or push anything heavier than 5 pounds (approximately the weight of 1 gallon of milk) until your doctor says it is ok. This may be for several days. You can walk around the house and do light activity, such as cooking. If the catheter was placed in your groin and you must use stairs, just go up and down slowly in as few trips as possible for the first couple of days. If the catheter was placed in your wrist, do not bend your wrist deeply for the first couple of days. A soft wrist-splint may be placed on your wrist as a reminder following the procedure.   Be careful using your hand to get into and out of a chair or bed and when opening doors. Get regular exercise, after being cleared by your cardiologist.  Walking may be a good choice. Try for at least 30 minutes on most days of the week. If you smoke or use smokeless tobacco products, including vaping products, it is extremely important that you quit using these products. Please ask you nurse or doctor for more information about smoking cessation. Diet  Drink plenty of fluids to help you body flush out the dye. If you have kidney, heart, or liver disease and have to limit fluids, talk to your doctor before increasing the amount of fluids you drink. Keep eating a heart-healthy diet that has lots of fruits, vegetables, and whole grains. If you have not been eating this way, talk to your doctor. You also may want to talk to a dietician. This expert can help you to learn about healthy foods and plan meals. Medicines  Your doctor will tell you if and when you can restart your medicines. You will also be given instructions about taking any new medicines. Take these medicines as prescribed. Continue taking your cholesterol lowering medications (statins), if you have been prescribed this. You doctor may prescribe a blood-thinning medicine like aspirin or clopidogrel (Plavix), plasugrel (Effient), or ticagrelor (Brilinta). If you had angioplasty or a stent placement, you must continue aspirin and Plavix, Effient, or Brilinta. It is very important that you take these medicines exactly as directed to keep the coronary artery open and reduce your risk of heart attack. Be safe with medicines. Call your doctor if you think you are having a problem with taking or obtaining your medicines, notify your doctor immediately. You cannot afford to miss your medications. Do not stop taking these medications without speaking to your cardiologist first.   Do not strain to have a bowel movement.   Ask your doctor if you feel you may need a stool softener of laxative. Care of the catheter site  For 1 or 2 days, keep a bandage over the spot where the catheter(s) was inserted. The bandage probably will fall off in this time. Put ice or a cold pack on the area for 10 to 20 minutes at a time to help with soreness of swelling. Place a thin cloth between the ice and your skin. You may shower 24 to 48 hours after the procedure, if your doctor says it is okay. Pat the incision dry with a clean towel afterwards. Do not soak the catheter site until it is healed. Don't take a bath for 1 week, or until your doctor tells you it is okay to do so. The use of lotions and powders is not recommended at the site until it is completely healed. Watch for bleeding from the site. A small amount of blood (up to the size of a quarter) on the bandage can be normal.  If you cough, sneeze, or laugh vigorously, apply direct pressure with your hand over the catheter site. If you notice a little bit of blood from the catheter site (similar to a paper cut or shaving nick), lie down and press firmly on the area for 15 minutes to try and make it stop bleeding. If the bleeding does not stop, call your doctor or seek immediate medical care. If you notice heavy bleeding at the site that has either saturated the bandage or is squirting, lie down, press firmly on the site, and have someone call 911. Follow-up care is a key part of your treatment and safety. Be sure to make and go to all appointments and call your doctor if you are having problems. It's also a good idea to know your test results and keep a list of medicines you take. When should you call for help? Call 911 anytime you think you may need emergency care. For example, call if:  You passed out (lost consciousness). You have severe trouble breathing. You have sudden chest pain and shortness of breath, or you cough up blood.   Call 911 if you have symptoms of a heart attack, such as:  Chest Pain, pressure, squeezing, or burning. Sweating. Shortness of breath or difficulty breathing. Nausea or vomiting. Jaw pain, shoulder pain, or arm pain in one or both sides. Feelings of fullness. Excessive fatigue or weakness. New onset anxiety. New onset of upper back pain. Dizziness or lightheadedness. A fast or uneven pulse. Call 911 if you have been diagnosed with angina, and you have symptoms that do not go away with rest or are not getting better within 5 minutes of taking a dose of your nitroglycerin. After you call 911, the  may tell you to chew 1 adult-strength (325 mg) of 2 to 4 low-dose aspirin (81 mg). Wait for ambulance. Do not drive yourself. Call your doctor now or seek immediate medical care if:  You are bleeding from the area where the catheter was inserted. You have a fast-growing, painful lump at the catheter site. You have signs of infection, such as  Increased pain, swelling, warmth or redness at the catheter site. Red streaks leading from the catheter site. Pus draining from the catheter site. A fever. Your leg or hand is painful, looks blue, or feels cold, numb, or tingly. Watch closely for changes in your health and be sure to contact your doctor if you have any problems.

## 2023-01-20 NOTE — PROGRESS NOTES
Patient complained of headache. Denies blurred vision. Up ambulated to the bathroom. Gait slow and steady. Dr Susan Ma paged and notified. New orders given for tylenol.

## 2023-01-20 NOTE — Clinical Note
Right groin, right radial and right brachial clipped prepped with ChloraPrep and draped. Wet prep elapsed drying time: 3 mins.

## 2023-01-20 NOTE — Clinical Note
TRANSFER - OUT REPORT:     Verbal report given to: (at bedside). Report consisted of patient's Situation, Background, Assessment and   Recommendations(SBAR). Opportunity for questions and clarification was provided. Patient transported with a Registered Nurse. Patient transported to: PACU.    Spoke to Jennifer in PACU to advise pt on the way

## 2023-01-20 NOTE — PROGRESS NOTES
Patient states headache is better but is still there. Denies vision change. Baseline blindness of right eye.

## 2023-01-20 NOTE — Clinical Note
Contrast Dose Calculator:   Patient's age: 52.   Patient's sex: Male. Patient weight (kg) = 125.8. Creatinine level (mg/dL) = 1.02.   Creatinine clearance (mL/min): 159. 31. Contrast concentration (mg/mL) = 370. MACD = 300 mL. Max Contrast dose per Creatinine Cl calculator = 358.44 mL.

## 2023-01-23 NOTE — PROGRESS NOTES
Called pt for post-procedure follow up. Pt stated he is continuing to have intermittent headaches and blurriness. Pt stated he has an follow up appointment with he PCP on 1/25/23. Prior to discharge pt had same symptoms; Physician ordered STAT CT of the head. Called physician and explained pts current symptoms. Physician states he is going to call the pt directly. Explained to the pt that the physician will call him and talk to him directly. Educated the pt if the physician does not call, to come to the ED to be evaluated. Pt verbalized understanding.

## 2023-01-24 ENCOUNTER — APPOINTMENT (OUTPATIENT)
Dept: CT IMAGING | Age: 48
End: 2023-01-24
Attending: STUDENT IN AN ORGANIZED HEALTH CARE EDUCATION/TRAINING PROGRAM
Payer: COMMERCIAL

## 2023-01-24 ENCOUNTER — HOSPITAL ENCOUNTER (EMERGENCY)
Age: 48
Discharge: HOME | End: 2023-01-24
Attending: STUDENT IN AN ORGANIZED HEALTH CARE EDUCATION/TRAINING PROGRAM
Payer: COMMERCIAL

## 2023-01-24 VITALS
WEIGHT: 270 LBS | SYSTOLIC BLOOD PRESSURE: 150 MMHG | BODY MASS INDEX: 37.8 KG/M2 | DIASTOLIC BLOOD PRESSURE: 99 MMHG | RESPIRATION RATE: 12 BRPM | TEMPERATURE: 97.9 F | OXYGEN SATURATION: 98 % | HEART RATE: 77 BPM | HEIGHT: 71 IN

## 2023-01-24 DIAGNOSIS — R51.9 NONINTRACTABLE EPISODIC HEADACHE, UNSPECIFIED HEADACHE TYPE: Primary | ICD-10-CM

## 2023-01-24 LAB
ALBUMIN SERPL-MCNC: 3.9 G/DL (ref 3.5–5)
ALBUMIN/GLOB SERPL: 1.2 (ref 1.1–2.2)
ALP SERPL-CCNC: 63 U/L (ref 45–117)
ALT SERPL-CCNC: 28 U/L (ref 12–78)
ANION GAP SERPL CALC-SCNC: 6 MMOL/L (ref 5–15)
AST SERPL W P-5'-P-CCNC: 23 U/L (ref 15–37)
BASOPHILS # BLD: 0.1 K/UL (ref 0–0.1)
BASOPHILS NFR BLD: 1 % (ref 0–1)
BILIRUB SERPL-MCNC: 0.9 MG/DL (ref 0.2–1)
BUN SERPL-MCNC: 23 MG/DL (ref 6–20)
BUN/CREAT SERPL: 21 (ref 12–20)
CA-I BLD-MCNC: 9.2 MG/DL (ref 8.5–10.1)
CHLORIDE SERPL-SCNC: 107 MMOL/L (ref 97–108)
CO2 SERPL-SCNC: 26 MMOL/L (ref 21–32)
CREAT SERPL-MCNC: 1.12 MG/DL (ref 0.7–1.3)
DIFFERENTIAL METHOD BLD: NORMAL
EOSINOPHIL # BLD: 0.2 K/UL (ref 0–0.4)
EOSINOPHIL NFR BLD: 2 % (ref 0–7)
ERYTHROCYTE [DISTWIDTH] IN BLOOD BY AUTOMATED COUNT: 13.8 % (ref 11.5–14.5)
GLOBULIN SER CALC-MCNC: 3.3 G/DL (ref 2–4)
GLUCOSE SERPL-MCNC: 93 MG/DL (ref 65–100)
HCT VFR BLD AUTO: 43.4 % (ref 36.6–50.3)
HGB BLD-MCNC: 14.7 G/DL (ref 12.1–17)
IMM GRANULOCYTES # BLD AUTO: 0 K/UL (ref 0–0.04)
IMM GRANULOCYTES NFR BLD AUTO: 0 % (ref 0–0.5)
LYMPHOCYTES # BLD: 2.8 K/UL (ref 0.8–3.5)
LYMPHOCYTES NFR BLD: 43 % (ref 12–49)
MCH RBC QN AUTO: 29.3 PG (ref 26–34)
MCHC RBC AUTO-ENTMCNC: 33.9 G/DL (ref 30–36.5)
MCV RBC AUTO: 86.6 FL (ref 80–99)
MONOCYTES # BLD: 0.5 K/UL (ref 0–1)
MONOCYTES NFR BLD: 7 % (ref 5–13)
NEUTS SEG # BLD: 3.1 K/UL (ref 1.8–8)
NEUTS SEG NFR BLD: 47 % (ref 32–75)
NRBC # BLD: 0 K/UL (ref 0–0.01)
NRBC BLD-RTO: 0 PER 100 WBC
PLATELET # BLD AUTO: 260 K/UL (ref 150–400)
PMV BLD AUTO: 9.2 FL (ref 8.9–12.9)
POTASSIUM SERPL-SCNC: 4.4 MMOL/L (ref 3.5–5.1)
PROT SERPL-MCNC: 7.2 G/DL (ref 6.4–8.2)
RBC # BLD AUTO: 5.01 M/UL (ref 4.1–5.7)
SODIUM SERPL-SCNC: 139 MMOL/L (ref 136–145)
WBC # BLD AUTO: 6.6 K/UL (ref 4.1–11.1)

## 2023-01-24 PROCEDURE — 70496 CT ANGIOGRAPHY HEAD: CPT

## 2023-01-24 PROCEDURE — 99285 EMERGENCY DEPT VISIT HI MDM: CPT

## 2023-01-24 PROCEDURE — 80053 COMPREHEN METABOLIC PANEL: CPT

## 2023-01-24 PROCEDURE — 36415 COLL VENOUS BLD VENIPUNCTURE: CPT

## 2023-01-24 PROCEDURE — 74011000636 HC RX REV CODE- 636: Performed by: STUDENT IN AN ORGANIZED HEALTH CARE EDUCATION/TRAINING PROGRAM

## 2023-01-24 PROCEDURE — 85025 COMPLETE CBC W/AUTO DIFF WBC: CPT

## 2023-01-24 RX ADMIN — IOPAMIDOL 100 ML: 755 INJECTION, SOLUTION INTRAVENOUS at 20:10

## 2023-01-24 NOTE — ED TRIAGE NOTES
Pt had heart cath on 1/20/23. Has had intermittent blurred vision and headaches ever since. Was sent to ED by Dr. Gopal Thorne to rule out complications of his heart cath.

## 2023-01-25 ENCOUNTER — OFFICE VISIT (OUTPATIENT)
Dept: INTERNAL MEDICINE CLINIC | Age: 48
End: 2023-01-25
Payer: COMMERCIAL

## 2023-01-25 VITALS
RESPIRATION RATE: 18 BRPM | HEIGHT: 71 IN | OXYGEN SATURATION: 98 % | BODY MASS INDEX: 38.86 KG/M2 | TEMPERATURE: 98.7 F | SYSTOLIC BLOOD PRESSURE: 128 MMHG | WEIGHT: 277.6 LBS | HEART RATE: 71 BPM | DIASTOLIC BLOOD PRESSURE: 81 MMHG

## 2023-01-25 DIAGNOSIS — R51.9 NONINTRACTABLE HEADACHE, UNSPECIFIED CHRONICITY PATTERN, UNSPECIFIED HEADACHE TYPE: ICD-10-CM

## 2023-01-25 DIAGNOSIS — I10 PRIMARY HYPERTENSION: ICD-10-CM

## 2023-01-25 DIAGNOSIS — Z11.59 NEED FOR HEPATITIS C SCREENING TEST: ICD-10-CM

## 2023-01-25 DIAGNOSIS — Z12.11 SCREENING FOR COLON CANCER: ICD-10-CM

## 2023-01-25 DIAGNOSIS — R06.02 SHORTNESS OF BREATH: ICD-10-CM

## 2023-01-25 DIAGNOSIS — F41.9 ANXIETY: ICD-10-CM

## 2023-01-25 DIAGNOSIS — Z00.00 ANNUAL PHYSICAL EXAM: Primary | ICD-10-CM

## 2023-01-25 PROCEDURE — 99396 PREV VISIT EST AGE 40-64: CPT | Performed by: STUDENT IN AN ORGANIZED HEALTH CARE EDUCATION/TRAINING PROGRAM

## 2023-01-25 RX ORDER — SERTRALINE HYDROCHLORIDE 25 MG/1
25 TABLET, FILM COATED ORAL DAILY
Qty: 30 TABLET | Refills: 2 | Status: SHIPPED | OUTPATIENT
Start: 2023-01-25

## 2023-01-25 NOTE — PROGRESS NOTES
Rowan Rojo (: 1975) is a 52 y.o. male, established patient, here for evaluation of the following chief complaint(s):  Physical       ASSESSMENT/PLAN:  Below is the assessment and plan developed based on review of pertinent history, physical exam, labs, studies, and medications. 1. Annual physical exam  2. Primary hypertension  -     LIPID PANEL  3. Anxiety  -     sertraline (ZOLOFT) 25 mg tablet; Take 1 Tablet by mouth daily. , Normal, Disp-30 Tablet, R-2  4. Need for hepatitis C screening test  -     HEPATITIS C AB  5. Screening for colon cancer  -     REFERRAL TO GASTROENTEROLOGY  6. Shortness of breath  7. Nonintractable headache, unspecified chronicity pattern, unspecified headache type      Patient presents for an annual.  Reviewed recent blood work from January. Lipid panel as well as hepatitis C screening test has been ordered. Will start him on Zoloft for anxiety. He was given a referral to gastroenterology for colonoscopy. He refused immunizations. In terms of his shortness of breath, he had a recent heart cath which was normal.  He states previously he has had lung function tests which were normal.  Continue trelegy. He does have a sleep study coming up, follow-up for that. Continue follow-up with pulmonology. Headache: take otc tylenol, follow up with neuro     Return in about 6 months (around 2023) for follow up with pcp . SUBJECTIVE/OBJECTIVE:  CHARLOTTE Rojo is a 26-year-old who presents to the clinic for physical.    He went to ER yesterday, for complaints of blurred vision and headaches. He had a recent angiogram.  He does have a history of a right ICA aneurysm. CT angio was done which showed stable aneurysm. He was given referral to neurology. He states the vision changes were intermittent. Improved. He also states he has an aortic anyersym as well which is monitored by cardiologst Dr. Amira Garg. I don't see imaging in chart.     He had a recent right and left heart cath. Which showed minimal luminal irregularities and he was told to continue medical management. He has a history of methamphetamine use. Denies any recent usage. He does have a hx of hypertension on metoprol, paliptaitons. He has a sleep study for the 18th of this month. He aso follows with pulmunologist, cannot remember the names of the physician. States he had PFTs and was told he has good lung fucntion. He states he still gets out of breath. He c/o anxiety, wants to try to medication. No c/o depression or suicidial/homicidal ideations. Refused immunizations. No Known Allergies  Current Outpatient Medications   Medication Sig    sertraline (ZOLOFT) 25 mg tablet Take 1 Tablet by mouth daily. metoprolol tartrate (LOPRESSOR) 25 mg tablet Take 25 mg by mouth two (2) times a day. fluticasone-umeclidinium-vilanterol (Trelegy Ellipta) 100-62.5-25 mcg inhaler Take 1 Puff by inhalation daily. No current facility-administered medications for this visit. Past Medical History:   Diagnosis Date    Anxiety     Arrhythmia     Arthritis     Headache      Past Surgical History:   Procedure Laterality Date    HX APPENDECTOMY  2018    HX WISDOM TEETH EXTRACTION      Has one remaining wisdom tooth on left upper     Family History   Problem Relation Age of Onset    Cancer Mother     COPD Father     Heart Disease Father     Cancer Maternal Grandmother     Cancer Maternal Grandfather      Social History     Tobacco Use   Smoking Status Never   Smokeless Tobacco Never         Review of Systems   Constitutional:  Negative for fever and malaise/fatigue. HENT:  Negative for congestion, hearing loss and sore throat. Eyes: Negative. Respiratory:  Positive for shortness of breath. Negative for cough. Cardiovascular:  Negative for chest pain, palpitations and leg swelling. Gastrointestinal:  Negative for abdominal pain, nausea and vomiting. Genitourinary: Negative. Musculoskeletal:  Negative for joint pain. Skin: Negative. Neurological:  Positive for headaches. Negative for focal weakness and loss of consciousness. Psychiatric/Behavioral: Negative. Vitals:    01/25/23 1256   BP: 128/81   Pulse: 71   Resp: 18   Temp: 98.7 °F (37.1 °C)   TempSrc: Oral   SpO2: 98%   Weight: 277 lb 9.6 oz (125.9 kg)   Height: 5' 11\" (1.803 m)      Physical Exam  Constitutional:       General: He is not in acute distress. Appearance: Normal appearance. HENT:      Head: Normocephalic. Cardiovascular:      Rate and Rhythm: Normal rate and regular rhythm. Heart sounds: Normal heart sounds. Pulmonary:      Effort: Pulmonary effort is normal.      Breath sounds: Normal breath sounds. Abdominal:      General: Bowel sounds are normal. There is no distension. Palpations: Abdomen is soft. Tenderness: There is no abdominal tenderness. Musculoskeletal:      Cervical back: Normal range of motion and neck supple. Neurological:      General: No focal deficit present. Mental Status: He is alert. Mental status is at baseline. Motor: No weakness. Psychiatric:         Mood and Affect: Mood normal.         Behavior: Behavior normal.               An electronic signature was used to authenticate this note.   -- Sharon Barksdale MD

## 2023-01-25 NOTE — ED PROVIDER NOTES
[facility] EMERGENCY DEPT  EMERGENCY DEPARTMENT HISTORY AND PHYSICAL EXAM      Date: 1/24/2023  Patient Name: Niles Melendez  MRN: [id number]  YOB: 1975  Date of evaluation: 1/24/2023  Provider: Eagle Henry MD   Note Started: 8:07 PM 1/24/23    HISTORY OF PRESENT ILLNESS     Chief Complaint   Patient presents with    Blurred Vision       History Provided By: Patient    HPI: Niles Melendez, 52 y.o. male, visual disturbance headaches. Patient states over the last week he has experienced intermittent episodes of headaches, visual disturbance in right thigh. Patient had a Angiocath completed for dyspnea by Dr. Gilbert valadez. Patient states all symptoms started after Angiocath. States in recovery he had severe headache, had a CT scan of his head completed which was resulted as negative, additionally patient had visual disturbance in right field described as \"color spectrum\" no note of any blurry vision, nausea vomiting, states currently he does not have any headache, no vision disturbance. No weakness in extremity, called Dr. Weston Hum office, was told to come to emergency department for further evaluation. 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     PAST MEDICAL HISTORY   Past Medical History:  Past Medical History:   Diagnosis Date    Anxiety     Arrhythmia     Arthritis     Headache        Past Surgical History:  Past Surgical History:   Procedure Laterality Date    HX APPENDECTOMY  2018    HX WISDOM TEETH EXTRACTION      Has one remaining wisdom tooth on left upper       Family History:  Family History   Problem Relation Age of Onset    Cancer Mother     COPD Father     Heart Disease Father     Cancer Maternal Grandmother     Cancer Maternal Grandfather        Social History:  Social History     Tobacco Use    Smoking status: Never    Smokeless tobacco: Never   Vaping Use    Vaping Use: Never used   Substance Use Topics    Alcohol use: Not Currently    Drug use: Not Currently     Types: Methamphetamines     Comment: 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 , Historical Med             REVIEW OF SYSTEMS   Review of Systems   Constitutional:  Negative for activity change, appetite change, chills and fever. HENT:  Negative for congestion, sore throat and trouble swallowing. Eyes:  Positive for visual disturbance. Negative for photophobia. Respiratory:  Negative for cough, chest tightness and shortness of breath. Cardiovascular:  Negative for chest pain and palpitations. Gastrointestinal:  Negative for abdominal pain and nausea. Genitourinary:  Negative for dysuria and flank pain. Musculoskeletal:  Negative for arthralgias and neck pain. Skin:  Negative for color change and pallor. Neurological:  Positive for headaches. Negative for dizziness, weakness and numbness. Positives and Pertinent negatives as per HPI. PHYSICAL EXAM     ED Triage Vitals [01/24/23 1737]   ED Encounter Vitals Group      BP (!) 170/95      Pulse (Heart Rate) 71      Resp Rate 18      Temp 98.1 °F (36.7 °C)      Temp src       O2 Sat (%) 98 %      Weight 270 lb      Height 5' 11\"      Physical Exam  Vitals and nursing note reviewed. Constitutional:       Appearance: Normal appearance. He is normal weight. HENT:      Head: Normocephalic and atraumatic. Nose: Nose normal.      Mouth/Throat:      Mouth: Mucous membranes are moist.   Eyes:      Extraocular Movements: Extraocular movements intact. Pupils: Pupils are equal, round, and reactive to light. Cardiovascular:      Rate and Rhythm: Normal rate and regular rhythm. Pulses: Normal pulses. Heart sounds: Normal heart sounds. Pulmonary:      Breath sounds: Normal breath sounds. Abdominal:      General: Abdomen is flat. Bowel sounds are normal.      Palpations: Abdomen is soft. Musculoskeletal:         General: No swelling or tenderness. Normal range of motion. Cervical back: Normal range of motion and neck supple. Skin:     General: Skin is warm and dry. Capillary Refill: Capillary refill takes less than 2 seconds. Neurological:      General: No focal deficit present. Mental Status: He is alert and oriented to person, place, and time. Cranial Nerves: No cranial nerve deficit. Sensory: No sensory deficit. Motor: No weakness. Psychiatric:         Mood and Affect: Mood normal.         Behavior: Behavior normal.       SCREENINGS               No data recorded        LAB, EKG AND DIAGNOSTIC RESULTS   Labs:  Recent Results (from the past 12 hour(s))   METABOLIC PANEL, COMPREHENSIVE    Collection Time: 01/24/23  7:07 PM   Result Value Ref Range    Sodium 139 136 - 145 mmol/L    Potassium 4.4 3.5 - 5.1 mmol/L    Chloride 107 97 - 108 mmol/L    CO2 26 21 - 32 mmol/L    Anion gap 6 5 - 15 mmol/L    Glucose 93 65 - 100 mg/dL    BUN 23 (H) 6 - 20 mg/dL    Creatinine 1.12 0.70 - 1.30 mg/dL    BUN/Creatinine ratio 21 (H) 12 - 20      eGFR >60 >60 ml/min/1.73m2    Calcium 9.2 8.5 - 10.1 mg/dL    Bilirubin, total 0.9 0.2 - 1.0 mg/dL    AST (SGOT) 23 15 - 37 U/L    ALT (SGPT) 28 12 - 78 U/L    Alk.  phosphatase 63 45 - 117 U/L    Protein, total 7.2 6.4 - 8.2 g/dL    Albumin 3.9 3.5 - 5.0 g/dL    Globulin 3.3 2.0 - 4.0 g/dL    A-G Ratio 1.2 1.1 - 2.2     CBC WITH AUTOMATED DIFF    Collection Time: 01/24/23  7:07 PM   Result Value Ref Range    WBC 6.6 4.1 - 11.1 K/uL    RBC 5.01 4.10 - 5.70 M/uL    HGB 14.7 12.1 - 17.0 g/dL    HCT 43.4 36.6 - 50.3 %    MCV 86.6 80.0 - 99.0 FL    MCH 29.3 26.0 - 34.0 PG    MCHC 33.9 30.0 - 36.5 g/dL    RDW 13.8 11.5 - 14.5 %    PLATELET 961 376 - 470 K/uL    MPV 9.2 8.9 - 12.9 FL    NRBC 0.0 0.0  WBC    ABSOLUTE NRBC 0.00 0.00 - 0.01 K/uL    NEUTROPHILS 47 32 - 75 %    LYMPHOCYTES 43 12 - 49 %    MONOCYTES 7 5 - 13 %    EOSINOPHILS 2 0 - 7 %    BASOPHILS 1 0 - 1 %    IMMATURE GRANULOCYTES 0 0 - 0.5 %    ABS. NEUTROPHILS 3.1 1.8 - 8.0 K/UL    ABS. LYMPHOCYTES 2.8 0.8 - 3.5 K/UL    ABS. MONOCYTES 0.5 0.0 - 1.0 K/UL    ABS. EOSINOPHILS 0.2 0.0 - 0.4 K/UL    ABS. BASOPHILS 0.1 0.0 - 0.1 K/UL    ABS. IMM. GRANS. 0.0 0.00 - 0.04 K/UL    DF AUTOMATED         EKG: Initial EKG interpreted by me. Shows     Radiologic Studies:  Non-plain film images such as CT, Ultrasound and MRI are read by the radiologist. Plain radiographic images are visualized and preliminarily interpreted by the ED Provider with the below findings:    *    Interpretation per the Radiologist below, if available at the time of this note:  CTA HEAD NECK W CONT    Result Date: 1/24/2023  CLINICAL HISTORY: Visual disturbance, history of carotid aneurysm. EXAMINATION:  CT ANGIOGRAPHY HEAD AND NECK COMPARISON: November 15, 2022 TECHNIQUE:  Following the uneventful administration of iodinated contrast material, axial CT angiography of the head and neck was performed. Coronal and sagittal reconstructions were obtained. Manual postprocessing of images was performed. 3-D  Sagittal maximal intensity projection images were obtained. 3-D Coronal maximal intensity projections were obtained. CT dose reduction was achieved through use of a standardized protocol tailored for this examination and automatic exposure control for dose modulation. This study was analyzed by the [address] ai algorithm. FINDINGS: CTA NECK: Great vessels: Normal arch anatomy with the origins patent.  Right subclavian artery: Patent Left subclavian artery: Patent Right common carotid artery: Patent Left common carotid artery: Patent Cervical right internal carotid artery: Patent with no significant stenosis by NASCET criteria. Unchanged 3 mm saccular outpouching of the right cervical internal carotid artery (4 1:53). Suggestive of a small aneurysm. Cervical left internal carotid artery: Patent with no significant stenosis by NASCET criteria. Right vertebral artery: Patent Left vertebral artery: Patent The lung apices are clear. The thyroid is homogeneous. No cervical lymphadenopathy. CTA HEAD: Right cavernous internal carotid artery: Patent Left cavernous internal carotid artery: Patent Anterior cerebral arteries: Patent Anterior communicating artery: Patent Right middle cerebral artery: Patent Left middle cerebral artery: Patent Posterior communicating arteries: Diminutive Posterior cerebral arteries: Patent Basilar artery: Patent Distal vertebral arteries: Patent No evidence for intracranial aneurysm or hemodynamically significant stenosis. No large vessel occlusion or significant carotid stenosis. Unchanged 3 mm saccular aneurysm of the distal cervical internal carotid arteries. PROCEDURES   Unless otherwise noted below, none.   Performed by: Patrick Wayne MD   Procedures      CRITICAL CARE TIME       ED COURSE and DIFFERENTIAL DIAGNOSIS/MDM   Vitals:    Vitals:    01/24/23 1737 01/24/23 1840 01/24/23 1841 01/24/23 2109   BP: (!) 170/95 (!) 156/109  (!) 150/99   Pulse: 71 73  77   Resp: 18 12     Temp: 98.1 °F (36.7 °C) 97.9 °F (36.6 °C)     SpO2: 98% 96% 96% 98%   Weight: 122.5 kg (270 lb)      Height: 5' 11\" (1.803 m)           Patient was given the following medications:  Medications   iopamidoL (ISOVUE-370) 370 mg iodine /mL (76 %) injection 100 mL (100 mL IntraVENous Given 1/24/23 2010)       CONSULTS: (Who and What was discussed)  None     Chronic Conditions: Hypertension  Social Determinants affecting Dx or Tx: None  Counseling:      Records Reviewed (source and summary of external notes): Nursing Notes, Old Medical Records, and Previous Radiology Studies    CC/HPI Summary, DDx, ED Course, and Reassessment: Headache, visual disturbance. Symptoms started approximately 2 weeks ago after Angiocath completed. Patient does have a history of right internal carotid artery aneurysm. Was told by cardiology clinic to come to emergency department for further evaluation. Patient completely asymptomatic at this time, no headaches, no blurry vision double vision no weakness in any extremities. Physical exam shows well-appearing male no distress, nonfocal neurological exam, nonfocal visual exam.    Given history of vascular abnormalities will obtain CTA of neck and head to rule out dissection and/or aneurysm complication. ED Course as of 01/24/23 2338   Tue Jan 24, 2023 2045 CTA head and neck completed, radiologist notes unchanged 3 mm saccular aneurysm of distal R cervical internal carotid artery, no large vessel occlusion no intra cranial vascular defects noted. Discussed results with patient at this time no signs symptoms of vascular cause of pain. Will discharge patient home instructed to continue taking ibuprofen as needed for headache, return to the emergency department if any worsening headache blurry vision double vision or nausea, will also provide patient with neurology follow-up information for neurology clinic follow-up. [PZ]      ED Course User Index  [PZ] Huma Fulton MD       Disposition Considerations (Tests not done, Shared Decision Making, Pt Expectation of Test or Treatment.):      FINAL IMPRESSION     1. Nonintractable episodic headache, unspecified headache type          DISPOSITION/PLAN   Discharged    Discharge Note: The patient is stable for discharge home. The signs, symptoms, diagnosis, and discharge instructions have been discussed, understanding conveyed, and agreed upon.  The patient is to follow up as recommended or return to ER should their symptoms worsen. PATIENT REFERRED TO:  Follow-up Information       Follow up With Specialties Details Why Contact Info    Laron Thibodeaux MD Neurology In 1 week If symptoms worsen [address]  [phone]                DISCHARGE MEDICATIONS:  Discharge Medication List as of 1/24/2023  9:03 PM            DISCONTINUED MEDICATIONS:  Discharge Medication List as of 1/24/2023  9:03 PM          I am the Primary Clinician of Record: Oral Cravne MD (electronically signed)    (Please note that parts of this dictation were completed with voice recognition software. Quite often unanticipated grammatical, syntax, homophones, and other interpretive errors are inadvertently transcribed by the computer software. Please disregards these errors.  Please excuse any errors that have escaped final proofreading.)

## 2023-01-25 NOTE — PROGRESS NOTES
1. \"Have you been to the ER, urgent care clinic since your last visit? Hospitalized since your last visit? \" Yes When: 01/25/2023 Where: [address]  Reason for visit: blurred vision     2. \"Have you seen or consulted any other health care providers outside of the [address] [place] [address] since your last visit? \" Yes When: 1 month ago Where: [facility] Reason for visit: SOB, Headaches, and blurred vision       3. For patients aged 39-70: Has the patient had a colonoscopy / FIT/ Cologuard? No      If the patient is female:    4. For patients aged 41-77: Has the patient had a mammogram within the past 2 years? NA - based on age or sex      11. For patients aged 21-65: Has the patient had a pap smear?  NA - based on age or sex    Chief Complaint   Patient presents with    Physical     Visit Vitals  /81 (BP 1 Location: Right upper arm, BP Patient Position: Sitting, BP Cuff Size: Adult)   Pulse 71   Temp 98.7 °F (37.1 °C) (Oral)   Resp 18   Ht 5' 11\" (1.803 m)   Wt 277 lb 9.6 oz (125.9 kg)   SpO2 98%   BMI 38.72 kg/m²

## 2023-02-17 ENCOUNTER — TRANSCRIBE ORDER (OUTPATIENT)
Dept: SCHEDULING | Age: 48
End: 2023-02-17

## 2023-02-17 DIAGNOSIS — R06.02 SOB (SHORTNESS OF BREATH): Primary | ICD-10-CM

## 2023-05-19 ENCOUNTER — TELEPHONE (OUTPATIENT)
Facility: CLINIC | Age: 48
End: 2023-05-19

## 2023-05-19 DIAGNOSIS — Z11.59 NEED FOR HEPATITIS C SCREENING TEST: ICD-10-CM

## 2023-05-19 DIAGNOSIS — I10 PRIMARY HYPERTENSION: Primary | ICD-10-CM

## 2023-05-19 DIAGNOSIS — Z11.3 ROUTINE SCREENING FOR STI (SEXUALLY TRANSMITTED INFECTION): ICD-10-CM

## 2023-05-24 DIAGNOSIS — Z11.59 NEED FOR HEPATITIS C SCREENING TEST: ICD-10-CM

## 2023-05-24 DIAGNOSIS — I10 PRIMARY HYPERTENSION: ICD-10-CM

## 2023-05-24 DIAGNOSIS — Z11.3 ROUTINE SCREENING FOR STI (SEXUALLY TRANSMITTED INFECTION): ICD-10-CM

## 2023-05-27 LAB
ALBUMIN SERPL-MCNC: 4.4 G/DL (ref 4–5)
ALBUMIN/GLOB SERPL: 1.7 {RATIO} (ref 1.2–2.2)
ALP SERPL-CCNC: 61 IU/L (ref 44–121)
ALT SERPL-CCNC: 36 IU/L (ref 0–44)
AST SERPL-CCNC: 59 IU/L (ref 0–40)
BASOPHILS # BLD AUTO: 0 X10E3/UL (ref 0–0.2)
BASOPHILS NFR BLD AUTO: 1 %
BILIRUB SERPL-MCNC: 0.9 MG/DL (ref 0–1.2)
BUN SERPL-MCNC: 19 MG/DL (ref 6–24)
BUN/CREAT SERPL: 18 (ref 9–20)
CALCIUM SERPL-MCNC: 9.3 MG/DL (ref 8.7–10.2)
CHLORIDE SERPL-SCNC: 103 MMOL/L (ref 96–106)
CHOLEST SERPL-MCNC: 265 MG/DL (ref 100–199)
CO2 SERPL-SCNC: 22 MMOL/L (ref 20–29)
CREAT SERPL-MCNC: 1.04 MG/DL (ref 0.76–1.27)
EGFRCR SERPLBLD CKD-EPI 2021: 89 ML/MIN/1.73
EOSINOPHIL # BLD AUTO: 0.1 X10E3/UL (ref 0–0.4)
EOSINOPHIL NFR BLD AUTO: 2 %
ERYTHROCYTE [DISTWIDTH] IN BLOOD BY AUTOMATED COUNT: 14.3 % (ref 11.6–15.4)
GLOBULIN SER CALC-MCNC: 2.6 G/DL (ref 1.5–4.5)
GLUCOSE SERPL-MCNC: 93 MG/DL (ref 70–99)
HCT VFR BLD AUTO: 45.6 % (ref 37.5–51)
HCV IGG SERPL QL IA: NON REACTIVE
HDLC SERPL-MCNC: 61 MG/DL
HGB BLD-MCNC: 15 G/DL (ref 13–17.7)
HIV 1+2 AB+HIV1 P24 AG SERPL QL IA: NON REACTIVE
IMM GRANULOCYTES # BLD AUTO: 0 X10E3/UL (ref 0–0.1)
IMM GRANULOCYTES NFR BLD AUTO: 0 %
LABORATORY COMMENT REPORT: ABNORMAL
LDLC SERPL CALC-MCNC: 190 MG/DL (ref 0–99)
LYMPHOCYTES # BLD AUTO: 2 X10E3/UL (ref 0.7–3.1)
LYMPHOCYTES NFR BLD AUTO: 40 %
MCH RBC QN AUTO: 28.9 PG (ref 26.6–33)
MCHC RBC AUTO-ENTMCNC: 32.9 G/DL (ref 31.5–35.7)
MCV RBC AUTO: 88 FL (ref 79–97)
MONOCYTES # BLD AUTO: 0.3 X10E3/UL (ref 0.1–0.9)
MONOCYTES NFR BLD AUTO: 6 %
NEUTROPHILS # BLD AUTO: 2.6 X10E3/UL (ref 1.4–7)
NEUTROPHILS NFR BLD AUTO: 51 %
PLATELET # BLD AUTO: 285 X10E3/UL (ref 150–450)
POTASSIUM SERPL-SCNC: 5.1 MMOL/L (ref 3.5–5.2)
PROT SERPL-MCNC: 7 G/DL (ref 6–8.5)
RBC # BLD AUTO: 5.19 X10E6/UL (ref 4.14–5.8)
RPR SER QL: NON REACTIVE
SODIUM SERPL-SCNC: 139 MMOL/L (ref 134–144)
TRIGL SERPL-MCNC: 81 MG/DL (ref 0–149)
TSH SERPL DL<=0.005 MIU/L-ACNC: 2.36 UIU/ML (ref 0.45–4.5)
VLDLC SERPL CALC-MCNC: 14 MG/DL (ref 5–40)
WBC # BLD AUTO: 5.1 X10E3/UL (ref 3.4–10.8)

## 2023-06-05 DIAGNOSIS — E78.00 PURE HYPERCHOLESTEROLEMIA: Primary | ICD-10-CM

## 2023-06-05 RX ORDER — ROSUVASTATIN CALCIUM 10 MG/1
10 TABLET, COATED ORAL DAILY
Qty: 30 TABLET | Refills: 2 | Status: SHIPPED | OUTPATIENT
Start: 2023-06-05

## 2023-06-27 ENCOUNTER — TELEPHONE (OUTPATIENT)
Facility: CLINIC | Age: 48
End: 2023-06-27

## 2023-07-06 ENCOUNTER — TELEPHONE (OUTPATIENT)
Facility: CLINIC | Age: 48
End: 2023-07-06

## 2023-07-06 NOTE — TELEPHONE ENCOUNTER
----- Message from Guido Beyer sent at 7/5/2023 11:44 AM EDT -----  Subject: Appointment Request    Reason for Call: Established Patient Appointment needed: New Patient   Request Appointment    QUESTIONS    Reason for appointment request? Requested Provider unavailable - Dr. Bruce Bustamante     Additional Information for Provider?  Pt would like to switch providers,   please advise  ---------------------------------------------------------------------------  --------------  Don Tirado INFO  [phone]; OK to leave message on voicemail  ---------------------------------------------------------------------------  --------------  SCRIPT ANSWERS

## 2023-07-18 ENCOUNTER — TELEPHONE (OUTPATIENT)
Facility: CLINIC | Age: 48
End: 2023-07-18

## 2023-07-18 NOTE — TELEPHONE ENCOUNTER
----- Message from Graciela Esposito sent at 7/17/2023 11:46 AM EDT -----  Subject: Appointment Request    Reason for Call: Established Patient Appointment needed: Routine (Patient   Request) No Script    QUESTIONS    Reason for appointment request? No appointments available during search     Additional Information for Provider? pt. would like to schedule appt.  to   go over lab results   ---------------------------------------------------------------------------  --------------  [place] Alyssia  [phone]; OK to leave message on voicemail  ---------------------------------------------------------------------------  --------------  SCRIPT ANSWERS

## 2023-07-18 NOTE — TELEPHONE ENCOUNTER
----- Message from Raymundo Bush sent at 7/17/2023 11:51 AM EDT -----  Subject: Referral Request    Reason for referral request? 2 aneurysm in head  Provider patient wants to be referred to(if known):     Provider Phone Number(if known):     Additional Information for Provider? pt. is requesting a order MRI to   check if aneurysm have changed any in size please, call pt. once orders   have been placed so he can schedule   ---------------------------------------------------------------------------  --------------  [address] Alyssia    [phone]; OK to leave message on voicemail  ---------------------------------------------------------------------------  --------------

## 2023-07-24 ENCOUNTER — OFFICE VISIT (OUTPATIENT)
Facility: CLINIC | Age: 48
End: 2023-07-24
Payer: COMMERCIAL

## 2023-07-24 VITALS
TEMPERATURE: 97.8 F | DIASTOLIC BLOOD PRESSURE: 78 MMHG | BODY MASS INDEX: 36.54 KG/M2 | HEIGHT: 71 IN | WEIGHT: 261 LBS | SYSTOLIC BLOOD PRESSURE: 115 MMHG | RESPIRATION RATE: 18 BRPM

## 2023-07-24 DIAGNOSIS — F41.9 ANXIETY: ICD-10-CM

## 2023-07-24 DIAGNOSIS — H53.8 BLURRED VISION: ICD-10-CM

## 2023-07-24 DIAGNOSIS — R51.9 NONINTRACTABLE HEADACHE, UNSPECIFIED CHRONICITY PATTERN, UNSPECIFIED HEADACHE TYPE: Primary | ICD-10-CM

## 2023-07-24 DIAGNOSIS — E78.00 PURE HYPERCHOLESTEROLEMIA: ICD-10-CM

## 2023-07-24 DIAGNOSIS — I10 PRIMARY HYPERTENSION: ICD-10-CM

## 2023-07-24 DIAGNOSIS — I72.0 ANEURYSM OF CAROTID ARTERY (HCC): Primary | ICD-10-CM

## 2023-07-24 DIAGNOSIS — Z13.31 POSITIVE DEPRESSION SCREENING: ICD-10-CM

## 2023-07-24 DIAGNOSIS — I67.1 ANEURYSM OF INTERNAL CAROTID ARTERY: ICD-10-CM

## 2023-07-24 DIAGNOSIS — R07.89 OTHER CHEST PAIN: ICD-10-CM

## 2023-07-24 DIAGNOSIS — I71.21 ANEURYSM OF ASCENDING AORTA WITHOUT RUPTURE (HCC): ICD-10-CM

## 2023-07-24 DIAGNOSIS — R79.89 ELEVATED LFTS: ICD-10-CM

## 2023-07-24 DIAGNOSIS — I72.0 ANEURYSM OF CAROTID ARTERY (HCC): ICD-10-CM

## 2023-07-24 DIAGNOSIS — R42 DIZZINESS AND GIDDINESS: ICD-10-CM

## 2023-07-24 DIAGNOSIS — F32.A DEPRESSION, UNSPECIFIED DEPRESSION TYPE: ICD-10-CM

## 2023-07-24 DIAGNOSIS — Z12.11 SCREENING FOR COLON CANCER: ICD-10-CM

## 2023-07-24 DIAGNOSIS — R07.89 OTHER CHEST PAIN: Primary | ICD-10-CM

## 2023-07-24 PROCEDURE — 3074F SYST BP LT 130 MM HG: CPT | Performed by: STUDENT IN AN ORGANIZED HEALTH CARE EDUCATION/TRAINING PROGRAM

## 2023-07-24 PROCEDURE — 3078F DIAST BP <80 MM HG: CPT | Performed by: STUDENT IN AN ORGANIZED HEALTH CARE EDUCATION/TRAINING PROGRAM

## 2023-07-24 PROCEDURE — 99214 OFFICE O/P EST MOD 30 MIN: CPT | Performed by: STUDENT IN AN ORGANIZED HEALTH CARE EDUCATION/TRAINING PROGRAM

## 2023-07-24 RX ORDER — ESCITALOPRAM OXALATE 10 MG/1
10 TABLET ORAL DAILY
Qty: 30 TABLET | Refills: 2 | Status: SHIPPED | OUTPATIENT
Start: 2023-07-24

## 2023-07-24 RX ORDER — MULTIVITAMIN,THERAPEUTIC
1 TABLET ORAL DAILY
COMMUNITY

## 2023-07-24 RX ORDER — ROSUVASTATIN CALCIUM 10 MG/1
10 TABLET, COATED ORAL DAILY
Qty: 30 TABLET | Refills: 2 | Status: SHIPPED | OUTPATIENT
Start: 2023-07-24

## 2023-07-24 SDOH — ECONOMIC STABILITY: FOOD INSECURITY: WITHIN THE PAST 12 MONTHS, YOU WORRIED THAT YOUR FOOD WOULD RUN OUT BEFORE YOU GOT MONEY TO BUY MORE.: OFTEN TRUE

## 2023-07-24 SDOH — ECONOMIC STABILITY: HOUSING INSECURITY
IN THE LAST 12 MONTHS, WAS THERE A TIME WHEN YOU DID NOT HAVE A STEADY PLACE TO SLEEP OR SLEPT IN A SHELTER (INCLUDING NOW)?: NO

## 2023-07-24 SDOH — ECONOMIC STABILITY: INCOME INSECURITY: IN THE LAST 12 MONTHS, WAS THERE A TIME WHEN YOU WERE NOT ABLE TO PAY THE MORTGAGE OR RENT ON TIME?: NO

## 2023-07-24 SDOH — ECONOMIC STABILITY: INCOME INSECURITY: HOW HARD IS IT FOR YOU TO PAY FOR THE VERY BASICS LIKE FOOD, HOUSING, MEDICAL CARE, AND HEATING?: VERY HARD

## 2023-07-24 SDOH — ECONOMIC STABILITY: HOUSING INSECURITY: IN THE LAST 12 MONTHS, HOW MANY PLACES HAVE YOU LIVED?: 1

## 2023-07-24 SDOH — ECONOMIC STABILITY: TRANSPORTATION INSECURITY
IN THE PAST 12 MONTHS, HAS THE LACK OF TRANSPORTATION KEPT YOU FROM MEDICAL APPOINTMENTS OR FROM GETTING MEDICATIONS?: NO

## 2023-07-24 SDOH — ECONOMIC STABILITY: TRANSPORTATION INSECURITY
IN THE PAST 12 MONTHS, HAS LACK OF TRANSPORTATION KEPT YOU FROM MEETINGS, WORK, OR FROM GETTING THINGS NEEDED FOR DAILY LIVING?: NO

## 2023-07-24 SDOH — ECONOMIC STABILITY: FOOD INSECURITY: WITHIN THE PAST 12 MONTHS, THE FOOD YOU BOUGHT JUST DIDN'T LAST AND YOU DIDN'T HAVE MONEY TO GET MORE.: OFTEN TRUE

## 2023-07-24 ASSESSMENT — ANXIETY QUESTIONNAIRES
1. FEELING NERVOUS, ANXIOUS, OR ON EDGE: 1
GAD7 TOTAL SCORE: 15
IF YOU CHECKED OFF ANY PROBLEMS ON THIS QUESTIONNAIRE, HOW DIFFICULT HAVE THESE PROBLEMS MADE IT FOR YOU TO DO YOUR WORK, TAKE CARE OF THINGS AT HOME, OR GET ALONG WITH OTHER PEOPLE: EXTREMELY DIFFICULT
3. WORRYING TOO MUCH ABOUT DIFFERENT THINGS: 3
2. NOT BEING ABLE TO STOP OR CONTROL WORRYING: 3
7. FEELING AFRAID AS IF SOMETHING AWFUL MIGHT HAPPEN: 3
5. BEING SO RESTLESS THAT IT IS HARD TO SIT STILL: 3
6. BECOMING EASILY ANNOYED OR IRRITABLE: 1
4. TROUBLE RELAXING: 1

## 2023-07-24 ASSESSMENT — COLUMBIA-SUICIDE SEVERITY RATING SCALE - C-SSRS
2. HAVE YOU ACTUALLY HAD ANY THOUGHTS OF KILLING YOURSELF?: YES
1. WITHIN THE PAST MONTH, HAVE YOU WISHED YOU WERE DEAD OR WISHED YOU COULD GO TO SLEEP AND NOT WAKE UP?: YES
4. HAVE YOU HAD THESE THOUGHTS AND HAD SOME INTENTION OF ACTING ON THEM?: NO
6. HAVE YOU EVER DONE ANYTHING, STARTED TO DO ANYTHING, OR PREPARED TO DO ANYTHING TO END YOUR LIFE?: NO
3. HAVE YOU BEEN THINKING ABOUT HOW YOU MIGHT KILL YOURSELF?: NO
5. HAVE YOU STARTED TO WORK OUT OR WORKED OUT THE DETAILS OF HOW TO KILL YOURSELF? DO YOU INTEND TO CARRY OUT THIS PLAN?: NO

## 2023-07-24 ASSESSMENT — LIFESTYLE VARIABLES
HOW MANY STANDARD DRINKS CONTAINING ALCOHOL DO YOU HAVE ON A TYPICAL DAY: PATIENT DOES NOT DRINK
HOW OFTEN DO YOU HAVE A DRINK CONTAINING ALCOHOL: NEVER

## 2023-07-24 ASSESSMENT — PATIENT HEALTH QUESTIONNAIRE - PHQ9
5. POOR APPETITE OR OVEREATING: 0
4. FEELING TIRED OR HAVING LITTLE ENERGY: 3
10. IF YOU CHECKED OFF ANY PROBLEMS, HOW DIFFICULT HAVE THESE PROBLEMS MADE IT FOR YOU TO DO YOUR WORK, TAKE CARE OF THINGS AT HOME, OR GET ALONG WITH OTHER PEOPLE: 1
3. TROUBLE FALLING OR STAYING ASLEEP: 2
6. FEELING BAD ABOUT YOURSELF - OR THAT YOU ARE A FAILURE OR HAVE LET YOURSELF OR YOUR FAMILY DOWN: 3
SUM OF ALL RESPONSES TO PHQ QUESTIONS 1-9: 16
SUM OF ALL RESPONSES TO PHQ QUESTIONS 1-9: 16
1. LITTLE INTEREST OR PLEASURE IN DOING THINGS: 1
SUM OF ALL RESPONSES TO PHQ QUESTIONS 1-9: 16
2. FEELING DOWN, DEPRESSED OR HOPELESS: 2
7. TROUBLE CONCENTRATING ON THINGS, SUCH AS READING THE NEWSPAPER OR WATCHING TELEVISION: 3
8. MOVING OR SPEAKING SO SLOWLY THAT OTHER PEOPLE COULD HAVE NOTICED. OR THE OPPOSITE, BEING SO FIGETY OR RESTLESS THAT YOU HAVE BEEN MOVING AROUND A LOT MORE THAN USUAL: 1
SUM OF ALL RESPONSES TO PHQ QUESTIONS 1-9: 15
9. THOUGHTS THAT YOU WOULD BE BETTER OFF DEAD, OR OF HURTING YOURSELF: 1
SUM OF ALL RESPONSES TO PHQ9 QUESTIONS 1 & 2: 3

## 2023-07-24 ASSESSMENT — ENCOUNTER SYMPTOMS
ABDOMINAL PAIN: 0
SORE THROAT: 0
DIARRHEA: 0
CONSTIPATION: 0
COUGH: 0
FACIAL SWELLING: 0
SHORTNESS OF BREATH: 1

## 2023-07-24 NOTE — PROGRESS NOTES
Abdifatah Ayon (: 1975) is a 50 y.o. male, Established patient patient, here for evaluation of the following chief complaint(s):  Follow-up, Hypertension, and Blurred Vision       ASSESSMENT/PLAN:  Below is the assessment and plan developed based on review of pertinent history, physical exam, labs, studies, and medications. 1. Nonintractable headache, unspecified chronicity pattern, unspecified headache type  2. Blurred vision  -     Amb External Referral To Ophthalmology  3. Pure hypercholesterolemia  -     rosuvastatin (CRESTOR) 10 MG tablet; Take 1 tablet by mouth daily, Disp-30 tablet, R-2Normal  4. Primary hypertension  5. Screening for colon cancer  -     Cologuard (Fecal DNA Colorectal Cancer Screening)  6. Depression, unspecified depression type  -     escitalopram (LEXAPRO) 10 MG tablet; Take 1 tablet by mouth daily, Disp-30 tablet, R-2Normal  -     Amb External Referral To Psychiatry  7. Positive depression screening  -     escitalopram (LEXAPRO) 10 MG tablet; Take 1 tablet by mouth daily, Disp-30 tablet, R-2Normal  -     Amb External Referral To Psychiatry  8. Elevated LFTs  9. Anxiety  -     escitalopram (LEXAPRO) 10 MG tablet; Take 1 tablet by mouth daily, Disp-30 tablet, R-2Normal  -     Amb External Referral To Psychiatry  10. Aneurysm of internal carotid artery  11. Aneurysm of ascending aorta without rupture (HCC)    Headaches: Could be multifactorial related to migraines, tension headaches, refractive errors. Patient was recommended to follow-up with the ophthalmologist as well as dentist.  Take Tylenol as needed. Denies fever, weight loss, neurological deficits. If he develops any of these sx was told to go ER. Refilled Crestor, counseled on the importance of taking this. Blood pressure controlled, continue current medications    Will start him on Lexapro for anxiety/depression. Patient was given referral to psychiatry. His memory issues could be because of depression.      He

## 2023-07-31 ENCOUNTER — TELEPHONE (OUTPATIENT)
Age: 48
End: 2023-07-31

## 2023-08-02 ENCOUNTER — HOSPITAL ENCOUNTER (OUTPATIENT)
Facility: HOSPITAL | Age: 48
Discharge: HOME | End: 2023-08-05
Attending: SURGERY
Payer: COMMERCIAL

## 2023-08-02 DIAGNOSIS — R42 DIZZINESS AND GIDDINESS: ICD-10-CM

## 2023-08-02 DIAGNOSIS — I72.0 ANEURYSM OF CAROTID ARTERY (HCC): ICD-10-CM

## 2023-08-02 DIAGNOSIS — R07.89 OTHER CHEST PAIN: ICD-10-CM

## 2023-08-02 PROCEDURE — 70498 CT ANGIOGRAPHY NECK: CPT

## 2023-08-02 PROCEDURE — 6360000004 HC RX CONTRAST MEDICATION: Performed by: SURGERY

## 2023-08-02 PROCEDURE — 71260 CT THORAX DX C+: CPT

## 2023-08-02 RX ADMIN — IOPAMIDOL 100 ML: 755 INJECTION, SOLUTION INTRAVENOUS at 09:49

## 2023-08-17 LAB — NONINV COLON CA DNA+OCC BLD SCRN STL QL: NEGATIVE

## 2023-10-05 ENCOUNTER — TELEPHONE (OUTPATIENT)
Age: 48
End: 2023-10-05

## 2023-10-05 NOTE — TELEPHONE ENCOUNTER
Patient called regarding [place] paperwork that was dropped off on Tuesday. He would like to have a phone call when completed.  [phone]

## 2023-10-05 NOTE — TELEPHONE ENCOUNTER
Spoke with patient and informed him that the paperwork is completed and will be signed and faxed over tomorrow.  Pt. Chrissie Martin

## 2023-10-06 ENCOUNTER — CLINICAL DOCUMENTATION (OUTPATIENT)
Age: 48
End: 2023-10-06

## 2023-10-06 NOTE — PROGRESS NOTES
Serious medical condition certification form received from [address] [place] [address]. Form completed by provider and mailed to home address on file. No release on file. No fax number listed.   Copy in media

## 2023-12-07 ENCOUNTER — TELEPHONE (OUTPATIENT)
Age: 48
End: 2023-12-07

## 2023-12-07 DIAGNOSIS — I67.1 INTERNAL CAROTID ANEURYSM: Primary | ICD-10-CM

## 2023-12-07 NOTE — TELEPHONE ENCOUNTER
Patient called requesting orders for CT scan for aneurysm and stated that can be called at [phone].

## 2023-12-07 NOTE — TELEPHONE ENCOUNTER
Shweta Abreu put in a new order for a CTA. I called and spoke with Nevin Sheffield 2 patient identifiers used. He is going to call scheduling to schedule the CTA and if it is after 12/11/2023 he is going to call our office and reschedule his appointment.

## 2023-12-14 ENCOUNTER — TELEPHONE (OUTPATIENT)
Age: 48
End: 2023-12-14

## 2023-12-14 DIAGNOSIS — I71.9 AORTIC ANEURYSM WITHOUT RUPTURE, UNSPECIFIED PORTION OF AORTA (HCC): Primary | ICD-10-CM

## 2023-12-14 NOTE — TELEPHONE ENCOUNTER
Patient called stating that needs Dr. Sana Asencio to put in order for CT Scan of chest (only currently one for neck) and patient states that normally does CT scan for both due to aneurysm. Patient stated that currently Scheduled 12/18/23 @ 2pm for neck CT.

## 2023-12-15 NOTE — TELEPHONE ENCOUNTER
Identified with two patient identifiers.  Spoke with patient informing him the order for CT on his chest has been put in

## 2024-03-21 NOTE — Clinical Note
Detail Level: Detailed Sheath #1: Sheath: inserted. Sheath inserted/placed in the right brachialcephalic  vein.  Georgie Morales Quality 130: Documentation Of Current Medications In The Medical Record: Current Medications Documented

## 2024-04-30 ENCOUNTER — HOSPITAL ENCOUNTER (OUTPATIENT)
Facility: HOSPITAL | Age: 49
Discharge: HOME | End: 2024-05-03
Attending: SURGERY
Payer: COMMERCIAL

## 2024-04-30 DIAGNOSIS — I71.9 AORTIC ANEURYSM WITHOUT RUPTURE, UNSPECIFIED PORTION OF AORTA (HCC): ICD-10-CM

## 2024-04-30 DIAGNOSIS — I67.1 INTERNAL CAROTID ANEURYSM: ICD-10-CM

## 2024-04-30 PROCEDURE — 71260 CT THORAX DX C+: CPT

## 2024-04-30 PROCEDURE — 70498 CT ANGIOGRAPHY NECK: CPT

## 2024-04-30 PROCEDURE — 6360000004 HC RX CONTRAST MEDICATION: Performed by: SURGERY

## 2024-04-30 RX ADMIN — IOPAMIDOL 100 ML: 755 INJECTION, SOLUTION INTRAVENOUS at 14:45

## 2024-05-03 ENCOUNTER — TELEPHONE (OUTPATIENT)
Age: 49
End: 2024-05-03

## 2024-05-03 NOTE — TELEPHONE ENCOUNTER
Patient called today regarding his CT  scan's that he had. He was looking on [facility] for the results of the CT. His question is he does not see the results of the CT of his chest. [phone]

## 2024-05-10 ENCOUNTER — OFFICE VISIT (OUTPATIENT)
Age: 49
End: 2024-05-10
Payer: COMMERCIAL

## 2024-05-10 VITALS
OXYGEN SATURATION: 97 % | HEIGHT: 71 IN | WEIGHT: 265 LBS | DIASTOLIC BLOOD PRESSURE: 82 MMHG | TEMPERATURE: 97.6 F | SYSTOLIC BLOOD PRESSURE: 133 MMHG | RESPIRATION RATE: 17 BRPM | HEART RATE: 68 BPM | BODY MASS INDEX: 37.1 KG/M2

## 2024-05-10 DIAGNOSIS — I72.0 CAROTID ARTERY ANEURYSM (HCC): Primary | ICD-10-CM

## 2024-05-10 PROCEDURE — 3079F DIAST BP 80-89 MM HG: CPT | Performed by: SURGERY

## 2024-05-10 PROCEDURE — 3075F SYST BP GE 130 - 139MM HG: CPT | Performed by: SURGERY

## 2024-05-10 PROCEDURE — 99212 OFFICE O/P EST SF 10 MIN: CPT | Performed by: SURGERY

## 2024-05-10 ASSESSMENT — PATIENT HEALTH QUESTIONNAIRE - PHQ9
SUM OF ALL RESPONSES TO PHQ9 QUESTIONS 1 & 2: 1
8. MOVING OR SPEAKING SO SLOWLY THAT OTHER PEOPLE COULD HAVE NOTICED. OR THE OPPOSITE, BEING SO FIGETY OR RESTLESS THAT YOU HAVE BEEN MOVING AROUND A LOT MORE THAN USUAL: SEVERAL DAYS
5. POOR APPETITE OR OVEREATING: NOT AT ALL
SUM OF ALL RESPONSES TO PHQ QUESTIONS 1-9: 3
9. THOUGHTS THAT YOU WOULD BE BETTER OFF DEAD, OR OF HURTING YOURSELF: NOT AT ALL
6. FEELING BAD ABOUT YOURSELF - OR THAT YOU ARE A FAILURE OR HAVE LET YOURSELF OR YOUR FAMILY DOWN: NOT AT ALL
SUM OF ALL RESPONSES TO PHQ QUESTIONS 1-9: 3
7. TROUBLE CONCENTRATING ON THINGS, SUCH AS READING THE NEWSPAPER OR WATCHING TELEVISION: NOT AT ALL
4. FEELING TIRED OR HAVING LITTLE ENERGY: SEVERAL DAYS
SUM OF ALL RESPONSES TO PHQ QUESTIONS 1-9: 3
2. FEELING DOWN, DEPRESSED OR HOPELESS: NOT AT ALL
10. IF YOU CHECKED OFF ANY PROBLEMS, HOW DIFFICULT HAVE THESE PROBLEMS MADE IT FOR YOU TO DO YOUR WORK, TAKE CARE OF THINGS AT HOME, OR GET ALONG WITH OTHER PEOPLE: SOMEWHAT DIFFICULT
1. LITTLE INTEREST OR PLEASURE IN DOING THINGS: SEVERAL DAYS
SUM OF ALL RESPONSES TO PHQ QUESTIONS 1-9: 3
3. TROUBLE FALLING OR STAYING ASLEEP: NOT AT ALL

## 2024-05-10 NOTE — PROGRESS NOTES
Identified patient with two patient identifiers (name and ). Reviewed chart in preparation for visit and have obtained necessary documentation.    Dakota Baker is a 48 y.o. male  Chief Complaint   Patient presents with    Fatigue    Lower Back Pain    Results     /82 (Site: Left Upper Arm, Position: Sitting, Cuff Size: Large Adult)   Pulse 68   Temp 97.6 °F (36.4 °C) (Oral)   Resp 17   Ht 1.803 m (5' 11\")   Wt 120.2 kg (265 lb)   SpO2 97%   BMI 36.96 kg/m²     1. Have you been to the ER, urgent care clinic since your last visit?  Hospitalized since your last visit?no    2. Have you seen or consulted any other health care providers outside of the [facility] since your last visit?  Include any pap smears or colon screening. no

## 2024-05-18 NOTE — PROGRESS NOTES
Vascular History and Physical    Patient: Dakota Baker  MRN: [id number]    YOB: 1975  Age: 48 y.o.  Sex: male     Chief Complaint:  Chief Complaint   Patient presents with    Fatigue    Lower Back Pain    Results       History of Present Illness: Dakota Baker is a 48 y.o. very pleasant gentleman is here today for follow-up repeat CT angiogram neck to follow-up right ICA aneurysm.  Patient currently asymptomatic.  Denies any headaches denies any neurologic symptoms.  Denies any speech problem denies any amaurosis fugax.  Patient blood pressure has remained under control as well.  Today's blood pressure is 133/82.    Social History:  Social Connections: Not on file       Past Medical History:  Past Medical History:   Diagnosis Date    Anxiety     Arrhythmia     Arthritis     Headache        Surgical History:  Past Surgical History:   Procedure Laterality Date    APPENDECTOMY  2018    WISDOM TOOTH EXTRACTION      Has one remaining wisdom tooth on left upper       Allergies:  No Known Allergies    Current Meds:  Current Outpatient Medications   Medication Sig Dispense Refill    Multiple Vitamin (THERA/BETA-CAROTENE) TABS Take 1 tablet by mouth daily (Patient not taking: Reported on 5/10/2024)      rosuvastatin (CRESTOR) 10 MG tablet Take 1 tablet by mouth daily (Patient not taking: Reported on 5/10/2024) 30 tablet 2    escitalopram (LEXAPRO) 10 MG tablet Take 1 tablet by mouth daily (Patient not taking: Reported on 5/10/2024) 30 tablet 2    fluticasone-umeclidin-vilant (TRELEGY ELLIPTA) 100-62.5-25 MCG/ACT AEPB inhaler Inhale 1 puff into the lungs daily (Patient not taking: Reported on 5/10/2024)      metoprolol tartrate (LOPRESSOR) 25 MG tablet Take 1 tablet by mouth 2 times daily (Patient not taking: Reported on 5/10/2024)       No current facility-administered medications for this visit.       Review of Systems:  I reviewed the rest of organ systems personally and they are negative.  Pertinent

## 2024-10-09 ENCOUNTER — TELEPHONE (OUTPATIENT)
Facility: CLINIC | Age: 49
End: 2024-10-09

## 2024-10-09 NOTE — TELEPHONE ENCOUNTER
----- Message from Meliza WANG sent at 10/8/2024  2:57 PM EDT -----  Regarding: ECC Message to Provider  ECC Message to Provider    Relationship to Patient: Self     Additional Information Patient is having appointment on 12/06/2024 at 1:30pm and requesting if theres a possible cancellation that he can fill in sooner than 12/06/202  --------------------------------------------------------------------------------------------------------------------------    Call Back Information: OK to leave message on voicemail  Preferred Call Back Number: Phone [phone] (home)

## 2024-10-14 ENCOUNTER — HOSPITAL ENCOUNTER (OUTPATIENT)
Facility: HOSPITAL | Age: 49
Discharge: HOME | End: 2024-10-17
Attending: SURGERY
Payer: COMMERCIAL

## 2024-10-14 DIAGNOSIS — I72.0 CAROTID ARTERY ANEURYSM (HCC): ICD-10-CM

## 2024-10-14 LAB
BUN SERPL-MCNC: 18 MG/DL (ref 6–20)
CREAT SERPL-MCNC: 1 MG/DL (ref 0.7–1.3)

## 2024-10-14 PROCEDURE — 36415 COLL VENOUS BLD VENIPUNCTURE: CPT

## 2024-10-14 PROCEDURE — 84520 ASSAY OF UREA NITROGEN: CPT

## 2024-10-14 PROCEDURE — 82565 ASSAY OF CREATININE: CPT

## 2024-10-18 ENCOUNTER — TELEPHONE (OUTPATIENT)
Age: 49
End: 2024-10-18

## 2024-10-18 DIAGNOSIS — R42 DIZZINESS AND GIDDINESS: ICD-10-CM

## 2024-10-18 DIAGNOSIS — I72.0 CAROTID ARTERY ANEURYSM (HCC): Primary | ICD-10-CM

## 2024-10-18 DIAGNOSIS — R07.9 CHEST PAIN, UNSPECIFIED TYPE: Primary | ICD-10-CM

## 2024-10-18 NOTE — TELEPHONE ENCOUNTER
Central Scheduling called regarding a CT scan. Patient went to have CT, IV was started but the was trouble with the order. It said Chest and Neck but his last office visit said neck only. They are waiting for a corrected order. Patient wants to schedule his surgery but needs his CT scan first. [phone]

## 2024-10-21 ENCOUNTER — TELEPHONE (OUTPATIENT)
Facility: CLINIC | Age: 49
End: 2024-10-21

## 2024-10-21 ENCOUNTER — TELEPHONE (OUTPATIENT)
Age: 49
End: 2024-10-21

## 2024-10-21 DIAGNOSIS — I72.0 ANEURYSM OF CAROTID ARTERY (HCC): ICD-10-CM

## 2024-10-21 DIAGNOSIS — R07.89 OTHER CHEST PAIN: ICD-10-CM

## 2024-10-21 DIAGNOSIS — I72.0 CAROTID ARTERY ANEURYSM (HCC): Primary | ICD-10-CM

## 2024-10-21 NOTE — TELEPHONE ENCOUNTER
Patient called and stated that he slept with someone that tested positive for chlamydia and he now is having symptoms. He would like a prescription sent to [facility]. Please advise.       Call back [phone]

## 2024-10-22 DIAGNOSIS — Z11.3 ROUTINE SCREENING FOR STI (SEXUALLY TRANSMITTED INFECTION): ICD-10-CM

## 2024-10-22 DIAGNOSIS — Z20.2 EXPOSURE TO CHLAMYDIA: Primary | ICD-10-CM

## 2024-10-22 DIAGNOSIS — Z20.2 EXPOSURE TO CHLAMYDIA: ICD-10-CM

## 2024-10-22 RX ORDER — DOXYCYCLINE HYCLATE 100 MG
100 TABLET ORAL 2 TIMES DAILY
Qty: 20 TABLET | Refills: 0 | Status: SHIPPED | OUTPATIENT
Start: 2024-10-22 | End: 2024-11-01

## 2024-10-22 NOTE — TELEPHONE ENCOUNTER
I called the patient to let him know that he can get his lab done and that the doxycycline was ordered concerning chlamydia but when I let him know this he stated that he has a concern for gonorrhea. I let the provider know and she stated that the patient needs to be seen at the [facility] for this so that he can get the right treatment.

## 2024-11-22 ENCOUNTER — HOSPITAL ENCOUNTER (EMERGENCY)
Facility: HOSPITAL | Age: 49
Discharge: HOME | End: 2024-11-22
Attending: EMERGENCY MEDICINE
Payer: COMMERCIAL

## 2024-11-22 VITALS
DIASTOLIC BLOOD PRESSURE: 77 MMHG | RESPIRATION RATE: 18 BRPM | OXYGEN SATURATION: 98 % | HEART RATE: 79 BPM | TEMPERATURE: 98.3 F | SYSTOLIC BLOOD PRESSURE: 146 MMHG

## 2024-11-22 DIAGNOSIS — T14.8XXA PUNCTURE WOUND: Primary | ICD-10-CM

## 2024-11-22 PROCEDURE — 90714 TD VACC NO PRESV 7 YRS+ IM: CPT | Performed by: EMERGENCY MEDICINE

## 2024-11-22 PROCEDURE — 99284 EMERGENCY DEPT VISIT MOD MDM: CPT

## 2024-11-22 PROCEDURE — 6360000002 HC RX W HCPCS: Performed by: EMERGENCY MEDICINE

## 2024-11-22 PROCEDURE — 90471 IMMUNIZATION ADMIN: CPT | Performed by: EMERGENCY MEDICINE

## 2024-11-22 RX ORDER — CIPROFLOXACIN 500 MG/1
500 TABLET, FILM COATED ORAL 2 TIMES DAILY
Qty: 14 TABLET | Refills: 0 | Status: SHIPPED | OUTPATIENT
Start: 2024-11-22 | End: 2024-11-29

## 2024-11-22 RX ADMIN — CLOSTRIDIUM TETANI TOXOID ANTIGEN (FORMALDEHYDE INACTIVATED) AND CORYNEBACTERIUM DIPHTHERIAE TOXOID ANTIGEN (FORMALDEHYDE INACTIVATED) 0.5 ML: 5; 2 INJECTION, SUSPENSION INTRAMUSCULAR at 19:13

## 2024-11-22 ASSESSMENT — PAIN SCALES - GENERAL: PAINLEVEL_OUTOF10: 7

## 2024-11-22 NOTE — ED TRIAGE NOTES
Pt arrived to ED via POV with CC of pain in right foot.     Pt stepped on nail about 5 days ago. Wound well appearing. No drainage or redness noted.     No tetanus in the past 7 years.

## 2024-11-23 NOTE — ED PROVIDER NOTES
ED COURSE           Patient was given the following medications:  Medications   tetanus & diphtheria toxoids (adult) 5-2 LFU injection 0.5 mL (0.5 mLs IntraMUSCular Given 11/22/24 1913)       CONSULTS: See ED Course/MDM for further details.            PROCEDURES   Unless otherwise noted above, none  Procedures      CRITICAL CARE TIME       ED IMPRESSION     1. Puncture wound          DISPOSITION/PLAN   DISPOSITION Decision To Discharge 11/22/2024 07:10:42 PM         Discharge Note: The patient is stable for discharge home. The signs, symptoms, diagnosis, and discharge instructions have been discussed, understanding conveyed, and agreed upon. The patient is to follow up as recommended or return to ER should their symptoms worsen.      PATIENT REFERRED TO:  Your Primary Care doctor    Call in 1 day          DISCHARGE MEDICATIONS:     Medication List        START taking these medications      ciprofloxacin 500 MG tablet  Commonly known as: CIPRO  Take 1 tablet by mouth 2 times daily for 7 days            ASK your doctor about these medications      escitalopram 10 MG tablet  Commonly known as: Lexapro  Take 1 tablet by mouth daily     metoprolol tartrate 25 MG tablet  Commonly known as: LOPRESSOR     rosuvastatin 10 MG tablet  Commonly known as: CRESTOR  Take 1 tablet by mouth daily     thera/beta-carotene Tabs     Trelegy Ellipta 100-62.5-25 MCG/ACT Aepb inhaler  Generic drug: fluticasone-umeclidin-vilant               Where to Get Your Medications        These medications were sent to [facility]/pharmacy #[id number] - [place], [place] - [address] - P [phone] - F [phone] [address] [facility] [address]      Phone: [phone]   ciprofloxacin 500 MG tablet           DISCONTINUED MEDICATIONS:  Current Discharge Medication List          I am the Primary Clinician of Record. Wilmer nunes MD (electronically signed)    (Please note that parts of this dictation were completed with voice recognition

## 2024-12-13 ENCOUNTER — HOSPITAL ENCOUNTER (OUTPATIENT)
Facility: HOSPITAL | Age: 49
Discharge: HOME | End: 2024-12-16
Attending: SURGERY
Payer: COMMERCIAL

## 2024-12-13 DIAGNOSIS — R07.89 OTHER CHEST PAIN: ICD-10-CM

## 2024-12-13 DIAGNOSIS — R42 DIZZINESS AND GIDDINESS: ICD-10-CM

## 2024-12-13 DIAGNOSIS — I72.0 CAROTID ARTERY ANEURYSM (HCC): ICD-10-CM

## 2024-12-13 DIAGNOSIS — I72.0 ANEURYSM OF CAROTID ARTERY (HCC): ICD-10-CM

## 2024-12-13 PROCEDURE — 6360000004 HC RX CONTRAST MEDICATION: Performed by: SURGERY

## 2024-12-13 PROCEDURE — 71260 CT THORAX DX C+: CPT

## 2024-12-13 PROCEDURE — 70496 CT ANGIOGRAPHY HEAD: CPT

## 2024-12-13 RX ORDER — IOPAMIDOL 755 MG/ML
100 INJECTION, SOLUTION INTRAVASCULAR
Status: COMPLETED | OUTPATIENT
Start: 2024-12-13 | End: 2024-12-13

## 2024-12-13 RX ADMIN — IOPAMIDOL 100 ML: 755 INJECTION, SOLUTION INTRAVENOUS at 11:00

## 2025-01-08 DIAGNOSIS — E78.00 PURE HYPERCHOLESTEROLEMIA: ICD-10-CM

## 2025-01-08 NOTE — TELEPHONE ENCOUNTER
Patient called requesting a refill for the following medications:          metoprolol tartrate (LOPRESSOR) 25 MG tablet       rosuvastatin (CRESTOR) 10 MG tablet           Please send to [facility]

## 2025-01-09 RX ORDER — METOPROLOL TARTRATE 25 MG/1
25 TABLET, FILM COATED ORAL 2 TIMES DAILY
Qty: 60 TABLET | Refills: 0 | Status: SHIPPED | OUTPATIENT
Start: 2025-01-09

## 2025-01-09 RX ORDER — ROSUVASTATIN CALCIUM 10 MG/1
10 TABLET, COATED ORAL DAILY
Qty: 30 TABLET | Refills: 0 | Status: SHIPPED | OUTPATIENT
Start: 2025-01-09

## 2025-01-10 ENCOUNTER — OFFICE VISIT (OUTPATIENT)
Age: 50
End: 2025-01-10
Payer: COMMERCIAL

## 2025-01-10 ENCOUNTER — TELEPHONE (OUTPATIENT)
Age: 50
End: 2025-01-10

## 2025-01-10 VITALS
HEIGHT: 71 IN | TEMPERATURE: 98.2 F | DIASTOLIC BLOOD PRESSURE: 89 MMHG | BODY MASS INDEX: 36.12 KG/M2 | SYSTOLIC BLOOD PRESSURE: 134 MMHG | WEIGHT: 258 LBS | OXYGEN SATURATION: 98 % | RESPIRATION RATE: 20 BRPM | HEART RATE: 74 BPM

## 2025-01-10 DIAGNOSIS — R06.02 SHORTNESS OF BREATH: Primary | ICD-10-CM

## 2025-01-10 DIAGNOSIS — R42 DIZZINESS AND GIDDINESS: ICD-10-CM

## 2025-01-10 DIAGNOSIS — I72.0 CAROTID ARTERY ANEURYSM (HCC): ICD-10-CM

## 2025-01-10 DIAGNOSIS — I71.21 ANEURYSM OF ASCENDING AORTA WITHOUT RUPTURE (HCC): ICD-10-CM

## 2025-01-10 PROCEDURE — 99212 OFFICE O/P EST SF 10 MIN: CPT | Performed by: SURGERY

## 2025-01-10 PROCEDURE — 3075F SYST BP GE 130 - 139MM HG: CPT | Performed by: SURGERY

## 2025-01-10 PROCEDURE — 3079F DIAST BP 80-89 MM HG: CPT | Performed by: SURGERY

## 2025-01-10 ASSESSMENT — PATIENT HEALTH QUESTIONNAIRE - PHQ9
SUM OF ALL RESPONSES TO PHQ QUESTIONS 1-9: 0
SUM OF ALL RESPONSES TO PHQ9 QUESTIONS 1 & 2: 0
2. FEELING DOWN, DEPRESSED OR HOPELESS: NOT AT ALL
1. LITTLE INTEREST OR PLEASURE IN DOING THINGS: NOT AT ALL
SUM OF ALL RESPONSES TO PHQ QUESTIONS 1-9: 0

## 2025-01-10 NOTE — PROGRESS NOTES
Identified pt with two pt identifiers (name and ). Reviewed chart in preparation for visit and have obtained necessary documentation.    Dakota Baker is a 49 y.o. male  Chief Complaint   Patient presents with    Follow-up     CT Scan results, complains of SOB     /89 (Site: Right Upper Arm, Position: Sitting, Cuff Size: Large Adult)   Pulse 74   Temp 98.2 °F (36.8 °C) (Oral)   Resp 20   Ht 1.803 m (5' 11\")   Wt 117 kg (258 lb)   SpO2 98%   BMI 35.98 kg/m²     1. Have you been to the ER, urgent care clinic since your last visit?  Hospitalized since your last visit?NO    2. Have you seen or consulted any other health care providers outside of the [facility] since your last visit?  Include any pap smears or colon screening. NO

## 2025-01-10 NOTE — TELEPHONE ENCOUNTER
Dr. Crabtree saw this patient today and would like to refer  him to three different doctors. 1. Pulmonary, 2. Neurology, 3. ENT. Please contact him at [phone]

## 2025-01-31 PROBLEM — R06.02 SHORTNESS OF BREATH: Status: ACTIVE | Noted: 2021-09-22

## 2025-01-31 NOTE — PROGRESS NOTES
Vascular History and Physical    Patient: Dakota Baker  MRN: [id number]    YOB: 1975  Age: 49 y.o.  Sex: male     Chief Complaint:  Chief Complaint   Patient presents with    Follow-up     CT Scan results, complains of SOB       History of Present Illness: Dakota Baker is a 49 y.o. very pleasant gentleman is here today follow-up right ICA aneurysm.  Patient is complaining of sinus problem also ongoing chest pain and shortness of breath as well as not feeling well in terms of dizzy spell and some other neurologic symptoms as well.  Patient however denies any focal neurodeficit or TIA symptoms.    Social History:  Social Connections: Not on file       Past Medical History:  Past Medical History:   Diagnosis Date    Anxiety     Arrhythmia     Arthritis     Bipolar disorder (HCC) 10 15 24    Depression     Headache     Hearing loss     Hypertension     Restless legs syndrome     Sleep apnea        Surgical History:  Past Surgical History:   Procedure Laterality Date    APPENDECTOMY  2018    WISDOM TOOTH EXTRACTION      Has one remaining wisdom tooth on left upper       Allergies:  No Known Allergies    Current Meds:  Current Outpatient Medications   Medication Sig Dispense Refill    rosuvastatin (CRESTOR) 10 MG tablet Take 1 tablet by mouth daily Courtesy refill given on behalf of PCP Dr Mirza. 30 tablet 0    metoprolol tartrate (LOPRESSOR) 25 MG tablet Take 1 tablet by mouth 2 times daily Courtesy refill given on behalf of PCP Dr Mirza. 60 tablet 0    Multiple Vitamin (THERA/BETA-CAROTENE) TABS Take 1 tablet by mouth daily (Patient not taking: Reported on 5/10/2024)      escitalopram (LEXAPRO) 10 MG tablet Take 1 tablet by mouth daily (Patient not taking: Reported on 5/10/2024) 30 tablet 2    fluticasone-umeclidin-vilant (TRELEGY ELLIPTA) 100-62.5-25 MCG/ACT AEPB inhaler Inhale 1 puff into the lungs daily (Patient not taking: Reported on 5/10/2024)       No current facility-administered

## 2025-03-24 ENCOUNTER — TELEPHONE (OUTPATIENT)
Age: 50
End: 2025-03-24

## 2025-04-01 ENCOUNTER — HOSPITAL ENCOUNTER (EMERGENCY)
Facility: HOSPITAL | Age: 50
Discharge: HOME | End: 2025-04-02
Attending: STUDENT IN AN ORGANIZED HEALTH CARE EDUCATION/TRAINING PROGRAM
Payer: COMMERCIAL

## 2025-04-01 ENCOUNTER — APPOINTMENT (OUTPATIENT)
Facility: HOSPITAL | Age: 50
End: 2025-04-01
Payer: COMMERCIAL

## 2025-04-01 DIAGNOSIS — K57.32 DIVERTICULITIS OF COLON: Primary | ICD-10-CM

## 2025-04-01 LAB
ALBUMIN SERPL-MCNC: 3.3 G/DL (ref 3.5–5)
ALBUMIN/GLOB SERPL: 0.9 (ref 1.1–2.2)
ALP SERPL-CCNC: 72 U/L (ref 45–117)
ALT SERPL-CCNC: 18 U/L (ref 12–78)
AMORPH CRY URNS QL MICRO: ABNORMAL
ANION GAP SERPL CALC-SCNC: 7 MMOL/L (ref 2–12)
APPEARANCE UR: ABNORMAL
AST SERPL W P-5'-P-CCNC: 15 U/L (ref 15–37)
BACTERIA URNS QL MICRO: NEGATIVE /HPF
BASOPHILS # BLD: 0.03 K/UL (ref 0–0.1)
BASOPHILS NFR BLD: 0.3 % (ref 0–1)
BILIRUB SERPL-MCNC: 0.9 MG/DL (ref 0.2–1)
BILIRUB UR QL: NEGATIVE
BUN SERPL-MCNC: 17 MG/DL (ref 6–20)
BUN/CREAT SERPL: 14 (ref 12–20)
CA-I BLD-MCNC: 8.7 MG/DL (ref 8.5–10.1)
CHLORIDE SERPL-SCNC: 104 MMOL/L (ref 97–108)
CO2 SERPL-SCNC: 28 MMOL/L (ref 21–32)
COLOR UR: YELLOW
CREAT SERPL-MCNC: 1.23 MG/DL (ref 0.7–1.3)
DIFFERENTIAL METHOD BLD: NORMAL
EOSINOPHIL # BLD: 0.16 K/UL (ref 0–0.4)
EOSINOPHIL NFR BLD: 1.5 % (ref 0–7)
EPITH CASTS URNS QL MICRO: ABNORMAL /LPF
ERYTHROCYTE [DISTWIDTH] IN BLOOD BY AUTOMATED COUNT: 14.1 % (ref 11.5–14.5)
GLOBULIN SER CALC-MCNC: 3.5 G/DL (ref 2–4)
GLUCOSE SERPL-MCNC: 82 MG/DL (ref 65–100)
GLUCOSE UR STRIP.AUTO-MCNC: 50 MG/DL
HCT VFR BLD AUTO: 41 % (ref 36.6–50.3)
HGB BLD-MCNC: 13.6 G/DL (ref 12.1–17)
HGB UR QL STRIP: NEGATIVE
IMM GRANULOCYTES # BLD AUTO: 0.01 K/UL (ref 0–0.04)
IMM GRANULOCYTES NFR BLD AUTO: 0.1 % (ref 0–0.5)
KETONES UR QL STRIP.AUTO: NEGATIVE MG/DL
LEUKOCYTE ESTERASE UR QL STRIP.AUTO: NEGATIVE
LIPASE SERPL-CCNC: 21 U/L (ref 13–75)
LYMPHOCYTES # BLD: 2.96 K/UL (ref 0.8–3.5)
LYMPHOCYTES NFR BLD: 28 % (ref 12–49)
MCH RBC QN AUTO: 28.9 PG (ref 26–34)
MCHC RBC AUTO-ENTMCNC: 33.2 G/DL (ref 30–36.5)
MCV RBC AUTO: 87 FL (ref 80–99)
MONOCYTES # BLD: 0.78 K/UL (ref 0–1)
MONOCYTES NFR BLD: 7.4 % (ref 5–13)
NEUTS SEG # BLD: 6.62 K/UL (ref 1.8–8)
NEUTS SEG NFR BLD: 62.7 % (ref 32–75)
NITRITE UR QL STRIP.AUTO: NEGATIVE
PH UR STRIP: 8 (ref 5–8)
PLATELET # BLD AUTO: 282 K/UL (ref 150–400)
PMV BLD AUTO: 9.1 FL (ref 8.9–12.9)
POTASSIUM SERPL-SCNC: 3.9 MMOL/L (ref 3.5–5.1)
PROT SERPL-MCNC: 6.8 G/DL (ref 6.4–8.2)
PROT UR STRIP-MCNC: NEGATIVE MG/DL
RBC # BLD AUTO: 4.71 M/UL (ref 4.1–5.7)
RBC #/AREA URNS HPF: ABNORMAL /HPF (ref 0–5)
SODIUM SERPL-SCNC: 139 MMOL/L (ref 136–145)
SP GR UR REFRACTOMETRY: 1.01 (ref 1–1.03)
UROBILINOGEN UR QL STRIP.AUTO: 0.1 EU/DL (ref 0.2–1)
WBC # BLD AUTO: 10.6 K/UL (ref 4.1–11.1)
WBC URNS QL MICRO: ABNORMAL /HPF (ref 0–4)

## 2025-04-01 PROCEDURE — 81001 URINALYSIS AUTO W/SCOPE: CPT

## 2025-04-01 PROCEDURE — 83690 ASSAY OF LIPASE: CPT

## 2025-04-01 PROCEDURE — 74018 RADEX ABDOMEN 1 VIEW: CPT

## 2025-04-01 PROCEDURE — 80053 COMPREHEN METABOLIC PANEL: CPT

## 2025-04-01 PROCEDURE — 99285 EMERGENCY DEPT VISIT HI MDM: CPT

## 2025-04-01 PROCEDURE — 85025 COMPLETE CBC W/AUTO DIFF WBC: CPT

## 2025-04-01 RX ORDER — MORPHINE SULFATE 4 MG/ML
4 INJECTION, SOLUTION INTRAMUSCULAR; INTRAVENOUS
Refills: 0 | Status: COMPLETED | OUTPATIENT
Start: 2025-04-01 | End: 2025-04-02

## 2025-04-01 RX ORDER — 0.9 % SODIUM CHLORIDE 0.9 %
1000 INTRAVENOUS SOLUTION INTRAVENOUS ONCE
Status: COMPLETED | OUTPATIENT
Start: 2025-04-01 | End: 2025-04-02

## 2025-04-01 RX ORDER — ONDANSETRON 2 MG/ML
4 INJECTION INTRAMUSCULAR; INTRAVENOUS ONCE
Status: COMPLETED | OUTPATIENT
Start: 2025-04-01 | End: 2025-04-02

## 2025-04-01 ASSESSMENT — PAIN - FUNCTIONAL ASSESSMENT
PAIN_FUNCTIONAL_ASSESSMENT: ACTIVITIES ARE NOT PREVENTED
PAIN_FUNCTIONAL_ASSESSMENT: 0-10

## 2025-04-01 ASSESSMENT — LIFESTYLE VARIABLES
HOW OFTEN DO YOU HAVE A DRINK CONTAINING ALCOHOL: NEVER
HOW MANY STANDARD DRINKS CONTAINING ALCOHOL DO YOU HAVE ON A TYPICAL DAY: PATIENT DOES NOT DRINK

## 2025-04-01 ASSESSMENT — PAIN DESCRIPTION - LOCATION: LOCATION: ABDOMEN

## 2025-04-01 ASSESSMENT — PAIN DESCRIPTION - DESCRIPTORS: DESCRIPTORS: STABBING;SHARP

## 2025-04-01 ASSESSMENT — PAIN DESCRIPTION - ORIENTATION: ORIENTATION: LEFT;LOWER

## 2025-04-01 ASSESSMENT — PAIN DESCRIPTION - PAIN TYPE: TYPE: ACUTE PAIN

## 2025-04-01 ASSESSMENT — PAIN SCALES - GENERAL: PAINLEVEL_OUTOF10: 10

## 2025-04-02 ENCOUNTER — APPOINTMENT (OUTPATIENT)
Facility: HOSPITAL | Age: 50
End: 2025-04-02
Payer: COMMERCIAL

## 2025-04-02 VITALS
HEART RATE: 75 BPM | HEIGHT: 71 IN | SYSTOLIC BLOOD PRESSURE: 134 MMHG | OXYGEN SATURATION: 97 % | TEMPERATURE: 99.1 F | DIASTOLIC BLOOD PRESSURE: 80 MMHG | WEIGHT: 245 LBS | BODY MASS INDEX: 34.3 KG/M2 | RESPIRATION RATE: 18 BRPM

## 2025-04-02 PROCEDURE — 74177 CT ABD & PELVIS W/CONTRAST: CPT

## 2025-04-02 PROCEDURE — 2580000003 HC RX 258: Performed by: STUDENT IN AN ORGANIZED HEALTH CARE EDUCATION/TRAINING PROGRAM

## 2025-04-02 PROCEDURE — 96374 THER/PROPH/DIAG INJ IV PUSH: CPT

## 2025-04-02 PROCEDURE — 96375 TX/PRO/DX INJ NEW DRUG ADDON: CPT

## 2025-04-02 PROCEDURE — 6360000002 HC RX W HCPCS: Performed by: STUDENT IN AN ORGANIZED HEALTH CARE EDUCATION/TRAINING PROGRAM

## 2025-04-02 PROCEDURE — 6360000004 HC RX CONTRAST MEDICATION: Performed by: STUDENT IN AN ORGANIZED HEALTH CARE EDUCATION/TRAINING PROGRAM

## 2025-04-02 RX ORDER — ONDANSETRON 4 MG/1
4 TABLET, ORALLY DISINTEGRATING ORAL 3 TIMES DAILY PRN
Qty: 10 TABLET | Refills: 0 | Status: SHIPPED | OUTPATIENT
Start: 2025-04-02 | End: 2025-04-03

## 2025-04-02 RX ORDER — OXYCODONE AND ACETAMINOPHEN 5; 325 MG/1; MG/1
1 TABLET ORAL EVERY 6 HOURS PRN
Qty: 12 TABLET | Refills: 0 | Status: SHIPPED | OUTPATIENT
Start: 2025-04-02 | End: 2025-04-05

## 2025-04-02 RX ORDER — IOPAMIDOL 755 MG/ML
100 INJECTION, SOLUTION INTRAVASCULAR
Status: COMPLETED | OUTPATIENT
Start: 2025-04-02 | End: 2025-04-02

## 2025-04-02 RX ADMIN — ONDANSETRON 4 MG: 2 INJECTION, SOLUTION INTRAMUSCULAR; INTRAVENOUS at 00:00

## 2025-04-02 RX ADMIN — IOPAMIDOL 100 ML: 755 INJECTION, SOLUTION INTRAVENOUS at 00:22

## 2025-04-02 RX ADMIN — MORPHINE SULFATE 4 MG: 4 INJECTION, SOLUTION INTRAMUSCULAR; INTRAVENOUS at 00:09

## 2025-04-02 RX ADMIN — SODIUM CHLORIDE 1000 ML: 0.9 INJECTION, SOLUTION INTRAVENOUS at 00:04

## 2025-04-02 ASSESSMENT — PAIN - FUNCTIONAL ASSESSMENT: PAIN_FUNCTIONAL_ASSESSMENT: 0-10

## 2025-04-02 ASSESSMENT — PAIN SCALES - GENERAL
PAINLEVEL_OUTOF10: 4
PAINLEVEL_OUTOF10: 10

## 2025-04-02 NOTE — ED PROVIDER NOTES
[facility] EMERGENCY DEPT  EMERGENCY DEPARTMENT HISTORY AND PHYSICAL EXAM      Date: 4/1/2025  Patient Name: Dakota Baker  MRN: [id number]  YOB: 1975  Date of evaluation: 4/1/2025  Provider: Darrel Adkins MD   Note Started: 1:33 AM EDT 4/2/25    HISTORY OF PRESENT ILLNESS     Chief Complaint   Patient presents with    Abdominal Pain       History Provided By: Patient    HPI: Dakota Baker is a 49 y.o. male presents to Emergency Department for evaluation of significant left lower quadrant pain.  Patient states pain started around 2 PM.  Concerned about constipation, took magnesium citrate without any relief.  Patient describes severe sharp left-sided pain nonradiating mild nausea.  No known history of kidney stones, no pain burning on urination, history of appendectomy.  PAST MEDICAL HISTORY   Past Medical History:  Past Medical History:   Diagnosis Date    Anxiety     Arrhythmia     Arthritis     Bipolar disorder (HCC) 10 15 24    Depression     Headache     Hearing loss     Hypertension     Restless legs syndrome     Sleep apnea        Past Surgical History:  Past Surgical History:   Procedure Laterality Date    APPENDECTOMY  2018    WISDOM TOOTH EXTRACTION      Has one remaining wisdom tooth on left upper       Family History:  Family History   Problem Relation Age of Onset    Cancer Mother     Cancer Maternal Grandfather     Arthritis Maternal Grandfather     High Blood Pressure Maternal Grandfather     Cancer Maternal Grandmother     Heart Disease Father     COPD Father     High Cholesterol Father     Vision Loss Father        Social History:  Social History     Tobacco Use    Smoking status: Never    Smokeless tobacco: Never   Vaping Use    Vaping status: Never Used   Substance Use Topics    Alcohol use: Not Currently    Drug use: Not Currently     Types: Methamphetamines (Crystal Meth)       Allergies:  No Known Allergies    PCP: Gaby Mirza MD    Current Meds:   No current

## 2025-04-03 ENCOUNTER — HOSPITAL ENCOUNTER (EMERGENCY)
Facility: HOSPITAL | Age: 50
Discharge: HOME | End: 2025-04-03
Attending: EMERGENCY MEDICINE
Payer: COMMERCIAL

## 2025-04-03 VITALS
WEIGHT: 245 LBS | OXYGEN SATURATION: 98 % | BODY MASS INDEX: 34.3 KG/M2 | SYSTOLIC BLOOD PRESSURE: 116 MMHG | TEMPERATURE: 98.5 F | RESPIRATION RATE: 16 BRPM | HEIGHT: 71 IN | DIASTOLIC BLOOD PRESSURE: 94 MMHG | HEART RATE: 78 BPM

## 2025-04-03 DIAGNOSIS — K57.32 DIVERTICULITIS OF COLON: Primary | ICD-10-CM

## 2025-04-03 LAB
ALBUMIN SERPL-MCNC: 2.9 G/DL (ref 3.5–5)
ALBUMIN/GLOB SERPL: 0.7 (ref 1.1–2.2)
ALP SERPL-CCNC: 62 U/L (ref 45–117)
ALT SERPL-CCNC: 18 U/L (ref 12–78)
ANION GAP SERPL CALC-SCNC: 10 MMOL/L (ref 2–12)
APPEARANCE UR: CLEAR
AST SERPL W P-5'-P-CCNC: 22 U/L (ref 15–37)
BACTERIA URNS QL MICRO: NEGATIVE /HPF
BASOPHILS # BLD: 0.04 K/UL (ref 0–0.1)
BASOPHILS NFR BLD: 0.4 % (ref 0–1)
BILIRUB SERPL-MCNC: 1.1 MG/DL (ref 0.2–1)
BILIRUB UR QL CFM: NEGATIVE
BILIRUB UR QL: ABNORMAL
BUN SERPL-MCNC: 13 MG/DL (ref 6–20)
BUN/CREAT SERPL: 11 (ref 12–20)
CA-I BLD-MCNC: 8.4 MG/DL (ref 8.5–10.1)
CHLORIDE SERPL-SCNC: 102 MMOL/L (ref 97–108)
CO2 SERPL-SCNC: 25 MMOL/L (ref 21–32)
COLOR UR: ABNORMAL
CREAT SERPL-MCNC: 1.14 MG/DL (ref 0.7–1.3)
DIFFERENTIAL METHOD BLD: NORMAL
EOSINOPHIL # BLD: 0.12 K/UL (ref 0–0.4)
EOSINOPHIL NFR BLD: 1.2 % (ref 0–7)
EPITH CASTS URNS QL MICRO: ABNORMAL /LPF
ERYTHROCYTE [DISTWIDTH] IN BLOOD BY AUTOMATED COUNT: 14.1 % (ref 11.5–14.5)
GLOBULIN SER CALC-MCNC: 4 G/DL (ref 2–4)
GLUCOSE SERPL-MCNC: 118 MG/DL (ref 65–100)
GLUCOSE UR STRIP.AUTO-MCNC: 250 MG/DL
HCT VFR BLD AUTO: 39.8 % (ref 36.6–50.3)
HGB BLD-MCNC: 13.2 G/DL (ref 12.1–17)
HGB UR QL STRIP: NEGATIVE
IMM GRANULOCYTES # BLD AUTO: 0.02 K/UL (ref 0–0.04)
IMM GRANULOCYTES NFR BLD AUTO: 0.2 % (ref 0–0.5)
KETONES UR QL STRIP.AUTO: NEGATIVE MG/DL
LEUKOCYTE ESTERASE UR QL STRIP.AUTO: ABNORMAL
LIPASE SERPL-CCNC: 23 U/L (ref 13–75)
LYMPHOCYTES # BLD: 2.69 K/UL (ref 0.8–3.5)
LYMPHOCYTES NFR BLD: 25.8 % (ref 12–49)
MCH RBC QN AUTO: 29.2 PG (ref 26–34)
MCHC RBC AUTO-ENTMCNC: 33.2 G/DL (ref 30–36.5)
MCV RBC AUTO: 88.1 FL (ref 80–99)
MONOCYTES # BLD: 0.78 K/UL (ref 0–1)
MONOCYTES NFR BLD: 7.5 % (ref 5–13)
NEUTS SEG # BLD: 6.77 K/UL (ref 1.8–8)
NEUTS SEG NFR BLD: 64.9 % (ref 32–75)
NITRITE UR QL STRIP.AUTO: NEGATIVE
PH UR STRIP: 5 (ref 5–8)
PLATELET # BLD AUTO: 269 K/UL (ref 150–400)
PMV BLD AUTO: 9 FL (ref 8.9–12.9)
POTASSIUM SERPL-SCNC: 4.3 MMOL/L (ref 3.5–5.1)
PROT SERPL-MCNC: 6.9 G/DL (ref 6.4–8.2)
PROT UR STRIP-MCNC: NEGATIVE MG/DL
RBC # BLD AUTO: 4.52 M/UL (ref 4.1–5.7)
RBC #/AREA URNS HPF: ABNORMAL /HPF (ref 0–5)
SODIUM SERPL-SCNC: 137 MMOL/L (ref 136–145)
SP GR UR REFRACTOMETRY: 1.02 (ref 1–1.03)
URINE CULTURE IF INDICATED: ABNORMAL
UROBILINOGEN UR QL STRIP.AUTO: 0.1 EU/DL (ref 0.2–1)
WBC # BLD AUTO: 10.4 K/UL (ref 4.1–11.1)
WBC URNS QL MICRO: ABNORMAL /HPF (ref 0–4)

## 2025-04-03 PROCEDURE — 6360000002 HC RX W HCPCS

## 2025-04-03 PROCEDURE — 83690 ASSAY OF LIPASE: CPT

## 2025-04-03 PROCEDURE — 81001 URINALYSIS AUTO W/SCOPE: CPT

## 2025-04-03 PROCEDURE — 96375 TX/PRO/DX INJ NEW DRUG ADDON: CPT

## 2025-04-03 PROCEDURE — 2500000003 HC RX 250 WO HCPCS

## 2025-04-03 PROCEDURE — 99284 EMERGENCY DEPT VISIT MOD MDM: CPT

## 2025-04-03 PROCEDURE — 6370000000 HC RX 637 (ALT 250 FOR IP)

## 2025-04-03 PROCEDURE — 85025 COMPLETE CBC W/AUTO DIFF WBC: CPT

## 2025-04-03 PROCEDURE — 96374 THER/PROPH/DIAG INJ IV PUSH: CPT

## 2025-04-03 PROCEDURE — 80053 COMPREHEN METABOLIC PANEL: CPT

## 2025-04-03 PROCEDURE — 36415 COLL VENOUS BLD VENIPUNCTURE: CPT

## 2025-04-03 PROCEDURE — 2580000003 HC RX 258

## 2025-04-03 RX ORDER — 0.9 % SODIUM CHLORIDE 0.9 %
1000 INTRAVENOUS SOLUTION INTRAVENOUS
Status: COMPLETED | OUTPATIENT
Start: 2025-04-03 | End: 2025-04-03

## 2025-04-03 RX ORDER — ONDANSETRON 4 MG/1
4 TABLET, ORALLY DISINTEGRATING ORAL 3 TIMES DAILY PRN
Qty: 21 TABLET | Refills: 0 | Status: SHIPPED | OUTPATIENT
Start: 2025-04-03

## 2025-04-03 RX ORDER — DICYCLOMINE HYDROCHLORIDE 10 MG/1
20 CAPSULE ORAL
Status: COMPLETED | OUTPATIENT
Start: 2025-04-03 | End: 2025-04-03

## 2025-04-03 RX ORDER — METRONIDAZOLE 500 MG/1
500 TABLET ORAL 2 TIMES DAILY
Qty: 14 TABLET | Refills: 0 | Status: SHIPPED | OUTPATIENT
Start: 2025-04-03 | End: 2025-04-10

## 2025-04-03 RX ORDER — KETOROLAC TROMETHAMINE 30 MG/ML
30 INJECTION, SOLUTION INTRAMUSCULAR; INTRAVENOUS ONCE
Status: COMPLETED | OUTPATIENT
Start: 2025-04-03 | End: 2025-04-03

## 2025-04-03 RX ORDER — CIPROFLOXACIN 500 MG/1
500 TABLET, FILM COATED ORAL 2 TIMES DAILY
Qty: 14 TABLET | Refills: 0 | Status: SHIPPED | OUTPATIENT
Start: 2025-04-03 | End: 2025-04-10

## 2025-04-03 RX ADMIN — CEFTRIAXONE SODIUM 1000 MG: 1 INJECTION, POWDER, FOR SOLUTION INTRAMUSCULAR; INTRAVENOUS at 14:30

## 2025-04-03 RX ADMIN — KETOROLAC TROMETHAMINE 30 MG: 30 INJECTION, SOLUTION INTRAMUSCULAR at 14:31

## 2025-04-03 RX ADMIN — DICYCLOMINE HYDROCHLORIDE 20 MG: 10 CAPSULE ORAL at 14:30

## 2025-04-03 RX ADMIN — SODIUM CHLORIDE 1000 ML: 0.9 INJECTION, SOLUTION INTRAVENOUS at 14:34

## 2025-04-03 ASSESSMENT — PAIN SCALES - GENERAL: PAINLEVEL_OUTOF10: 6

## 2025-04-03 ASSESSMENT — PAIN DESCRIPTION - DESCRIPTORS: DESCRIPTORS: TENDER

## 2025-04-03 ASSESSMENT — PAIN - FUNCTIONAL ASSESSMENT: PAIN_FUNCTIONAL_ASSESSMENT: 0-10

## 2025-04-03 ASSESSMENT — PAIN DESCRIPTION - LOCATION: LOCATION: ABDOMEN

## 2025-04-03 NOTE — ED TRIAGE NOTES
Pt reporting abd pain that started in left lower abdomen now radiating to the right side. Was seen two days ago and diagnosed with diverticulitis and is not feeling any better. Complaining of some diarrhea.

## 2025-04-03 NOTE — ED PROVIDER NOTES
.Face-To-Face Shared Encounter with an CAMILA's Patient:      The patient presents with left-sided abdominal pain since Sunday.  Was diagnosed with mild diverticulitis on April 1 prescribe short course of Percocet liquid diet and advised follow-up with PCP.  He has not experienced any nausea vomiting diarrhea.  Does state he has loose stools no blood in them.  States his abdominal pain felt a little more generalized today but not necessarily worse on my history.  He took a Percocet this morning.  He works as a contractor.  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  My exam shows overall well-appearing.  Mildly dry mucous membranes.  Darker colored urine sample on counter.  Regular rate and rhythm, normal work of breathing clear lungs auscultation bilaterally.  Benign abdominal exam minimal tenderness left side of abdomen no signs of peritonitis no guarding.  Nondistended.  Soft.  Impression/Plan: This is a reassuring abdominal exam on my evaluation.  I am not concerned on this presentation or exam for development of perforation or abscess or complication from diverticulitis.  He is overall well-appearing albeit mildly dry.  Will give IV fluids Toradol Bentyl for pain.  Recommended liquid diet for another 24 hours except for some bland food with antibiotics.  Think is worth starting Flagyl and Cipro 7-day course of each in this patient as he has had a return visit for diverticulitis.  Advised him to make an appointment next week with his PCP.  We discussed sedation precautions with use of Percocet the patient is aware he should not be operating heavy machinery climbing ladders or driving after taking Percocet.  He voiced understanding of this.  I will give him a work note to use as needed for next several days ideally I would like him to rest and he voiced understanding to this.  He understands return precautions for worsening abdominal pain nausea vomiting or fevers.    Labs reassuring well-appearing on reexamination 
    ciprofloxacin 500 MG tablet  Commonly known as: CIPRO  Take 1 tablet by mouth 2 times daily for 7 days     metroNIDAZOLE 500 MG tablet  Commonly known as: FLAGYL  Take 1 tablet by mouth 2 times daily for 7 days            ASK your doctor about these medications      escitalopram 10 MG tablet  Commonly known as: Lexapro  Take 1 tablet by mouth daily     metoprolol tartrate 25 MG tablet  Commonly known as: LOPRESSOR  Take 1 tablet by mouth 2 times daily Courtesy refill given on behalf of PCP Dr Mirza.     ondansetron 4 MG disintegrating tablet  Commonly known as: ZOFRAN-ODT  Take 1 tablet by mouth 3 times daily as needed for Nausea or Vomiting     oxyCODONE-acetaminophen 5-325 MG per tablet  Commonly known as: Percocet  Take 1 tablet by mouth every 6 hours as needed for Pain for up to 3 days. Intended supply: 3 days. Take lowest dose possible to manage pain Max Daily Amount: 4 tablets     rosuvastatin 10 MG tablet  Commonly known as: CRESTOR  Take 1 tablet by mouth daily Courtesy refill given on behalf of PCP Dr Mirza.     thera/beta-carotene Tabs     Trelegy Ellipta 100-62.5-25 MCG/ACT Aepb inhaler  Generic drug: fluticasone-umeclidin-vilant               Where to Get Your Medications        These medications were sent to [facility] [place], [place] - [facility] [address] - P [phone] - F [phone]  [facility] [address] [facility] [address]      Phone: [phone]   ciprofloxacin 500 MG tablet  metroNIDAZOLE 500 MG tablet           DISCONTINUED MEDICATIONS:  Current Discharge Medication List          I am the Primary Clinician of Record. Lupis Li PA-C (electronically signed)    (Please note that parts of this dictation were completed with voice recognition software. Quite often unanticipated grammatical, syntax, homophones, and other interpretive errors are inadvertently transcribed by the computer software. Please disregards these errors. Please excuse any errors that have

## 2025-05-28 ENCOUNTER — TELEPHONE (OUTPATIENT)
Age: 50
End: 2025-05-28

## 2025-05-28 NOTE — TELEPHONE ENCOUNTER
Patient called stating he did not want to wait a year to do his CT to check for aneurysms wants to go in the next month advised him there is a neck and a chest CT provided number to central scheduling. Scheduled 1 year follow up appointment as well
